# Patient Record
Sex: MALE | Race: BLACK OR AFRICAN AMERICAN | NOT HISPANIC OR LATINO | URBAN - METROPOLITAN AREA
[De-identification: names, ages, dates, MRNs, and addresses within clinical notes are randomized per-mention and may not be internally consistent; named-entity substitution may affect disease eponyms.]

---

## 2017-11-29 ENCOUNTER — EMERGENCY (EMERGENCY)
Facility: HOSPITAL | Age: 47
LOS: 1 days | Discharge: HOME | End: 2017-11-29
Attending: EMERGENCY MEDICINE | Admitting: EMERGENCY MEDICINE
Payer: MEDICARE

## 2017-11-29 VITALS
OXYGEN SATURATION: 99 % | RESPIRATION RATE: 18 BRPM | TEMPERATURE: 99 F | DIASTOLIC BLOOD PRESSURE: 99 MMHG | SYSTOLIC BLOOD PRESSURE: 176 MMHG | HEART RATE: 75 BPM | WEIGHT: 166.89 LBS | HEIGHT: 76 IN

## 2017-11-29 DIAGNOSIS — Z79.899 OTHER LONG TERM (CURRENT) DRUG THERAPY: ICD-10-CM

## 2017-11-29 DIAGNOSIS — Z79.52 LONG TERM (CURRENT) USE OF SYSTEMIC STEROIDS: ICD-10-CM

## 2017-11-29 DIAGNOSIS — R51 HEADACHE: ICD-10-CM

## 2017-11-29 DIAGNOSIS — G44.009 CLUSTER HEADACHE SYNDROME, UNSPECIFIED, NOT INTRACTABLE: ICD-10-CM

## 2017-11-29 DIAGNOSIS — I10 ESSENTIAL (PRIMARY) HYPERTENSION: ICD-10-CM

## 2017-11-29 LAB
ALBUMIN SERPL ELPH-MCNC: 4.1 G/DL — SIGNIFICANT CHANGE UP (ref 3.3–5)
ALP SERPL-CCNC: 63 U/L — SIGNIFICANT CHANGE UP (ref 40–120)
ALT FLD-CCNC: 13 U/L — SIGNIFICANT CHANGE UP (ref 10–45)
ANION GAP SERPL CALC-SCNC: 13 MMOL/L — SIGNIFICANT CHANGE UP (ref 5–17)
AST SERPL-CCNC: 23 U/L — SIGNIFICANT CHANGE UP (ref 10–40)
BASOPHILS NFR BLD AUTO: 0.7 % — SIGNIFICANT CHANGE UP (ref 0–2)
BILIRUB SERPL-MCNC: <0.2 MG/DL — SIGNIFICANT CHANGE UP (ref 0.2–1.2)
BUN SERPL-MCNC: 15 MG/DL — SIGNIFICANT CHANGE UP (ref 7–23)
CALCIUM SERPL-MCNC: 8.8 MG/DL — SIGNIFICANT CHANGE UP (ref 8.4–10.5)
CHLORIDE SERPL-SCNC: 103 MMOL/L — SIGNIFICANT CHANGE UP (ref 96–108)
CO2 SERPL-SCNC: 24 MMOL/L — SIGNIFICANT CHANGE UP (ref 22–31)
CREAT SERPL-MCNC: 0.98 MG/DL — SIGNIFICANT CHANGE UP (ref 0.5–1.3)
EOSINOPHIL NFR BLD AUTO: 0.2 % — SIGNIFICANT CHANGE UP (ref 0–6)
GLUCOSE SERPL-MCNC: 101 MG/DL — HIGH (ref 70–99)
HCT VFR BLD CALC: 37.1 % — LOW (ref 39–50)
HGB BLD-MCNC: 12.7 G/DL — LOW (ref 13–17)
LYMPHOCYTES # BLD AUTO: 32.9 % — SIGNIFICANT CHANGE UP (ref 13–44)
MAGNESIUM SERPL-MCNC: 1.9 MG/DL — SIGNIFICANT CHANGE UP (ref 1.6–2.6)
MCHC RBC-ENTMCNC: 28.8 PG — SIGNIFICANT CHANGE UP (ref 27–34)
MCHC RBC-ENTMCNC: 34.2 G/DL — SIGNIFICANT CHANGE UP (ref 32–36)
MCV RBC AUTO: 84.1 FL — SIGNIFICANT CHANGE UP (ref 80–100)
MONOCYTES NFR BLD AUTO: 7.4 % — SIGNIFICANT CHANGE UP (ref 2–14)
NEUTROPHILS NFR BLD AUTO: 58.8 % — SIGNIFICANT CHANGE UP (ref 43–77)
PLATELET # BLD AUTO: 294 K/UL — SIGNIFICANT CHANGE UP (ref 150–400)
POTASSIUM SERPL-MCNC: 5.3 MMOL/L — SIGNIFICANT CHANGE UP (ref 3.5–5.3)
POTASSIUM SERPL-SCNC: 5.3 MMOL/L — SIGNIFICANT CHANGE UP (ref 3.5–5.3)
PROT SERPL-MCNC: 6.9 G/DL — SIGNIFICANT CHANGE UP (ref 6–8.3)
RBC # BLD: 4.41 M/UL — SIGNIFICANT CHANGE UP (ref 4.2–5.8)
RBC # FLD: 14.1 % — SIGNIFICANT CHANGE UP (ref 10.3–16.9)
SODIUM SERPL-SCNC: 140 MMOL/L — SIGNIFICANT CHANGE UP (ref 135–145)
WBC # BLD: 4.4 K/UL — SIGNIFICANT CHANGE UP (ref 3.8–10.5)
WBC # FLD AUTO: 4.4 K/UL — SIGNIFICANT CHANGE UP (ref 3.8–10.5)

## 2017-11-29 PROCEDURE — 85025 COMPLETE CBC W/AUTO DIFF WBC: CPT

## 2017-11-29 PROCEDURE — 99284 EMERGENCY DEPT VISIT MOD MDM: CPT

## 2017-11-29 PROCEDURE — 99284 EMERGENCY DEPT VISIT MOD MDM: CPT | Mod: 25

## 2017-11-29 PROCEDURE — 36415 COLL VENOUS BLD VENIPUNCTURE: CPT

## 2017-11-29 PROCEDURE — 96372 THER/PROPH/DIAG INJ SC/IM: CPT | Mod: XU

## 2017-11-29 PROCEDURE — 83735 ASSAY OF MAGNESIUM: CPT

## 2017-11-29 PROCEDURE — 96375 TX/PRO/DX INJ NEW DRUG ADDON: CPT

## 2017-11-29 PROCEDURE — 80053 COMPREHEN METABOLIC PANEL: CPT

## 2017-11-29 PROCEDURE — 96374 THER/PROPH/DIAG INJ IV PUSH: CPT

## 2017-11-29 RX ORDER — KETOROLAC TROMETHAMINE 30 MG/ML
30 SYRINGE (ML) INJECTION ONCE
Qty: 0 | Refills: 0 | Status: DISCONTINUED | OUTPATIENT
Start: 2017-11-29 | End: 2017-11-29

## 2017-11-29 RX ORDER — DEXAMETHASONE 0.5 MG/5ML
10 ELIXIR ORAL ONCE
Qty: 0 | Refills: 0 | Status: COMPLETED | OUTPATIENT
Start: 2017-11-29 | End: 2017-11-29

## 2017-11-29 RX ORDER — SUMATRIPTAN SUCCINATE 4 MG/.5ML
1 INJECTION, SOLUTION SUBCUTANEOUS
Qty: 12 | Refills: 0
Start: 2017-11-29 | End: 2017-12-03

## 2017-11-29 RX ORDER — METOCLOPRAMIDE HCL 10 MG
10 TABLET ORAL ONCE
Qty: 0 | Refills: 0 | Status: COMPLETED | OUTPATIENT
Start: 2017-11-29 | End: 2017-11-29

## 2017-11-29 RX ORDER — DIPHENHYDRAMINE HCL 50 MG
25 CAPSULE ORAL ONCE
Qty: 0 | Refills: 0 | Status: COMPLETED | OUTPATIENT
Start: 2017-11-29 | End: 2017-11-29

## 2017-11-29 RX ORDER — SODIUM CHLORIDE 9 MG/ML
1000 INJECTION INTRAMUSCULAR; INTRAVENOUS; SUBCUTANEOUS ONCE
Qty: 0 | Refills: 0 | Status: COMPLETED | OUTPATIENT
Start: 2017-11-29 | End: 2017-11-29

## 2017-11-29 RX ORDER — SUMATRIPTAN SUCCINATE 4 MG/.5ML
6 INJECTION, SOLUTION SUBCUTANEOUS ONCE
Qty: 0 | Refills: 0 | Status: COMPLETED | OUTPATIENT
Start: 2017-11-29 | End: 2017-11-29

## 2017-11-29 RX ADMIN — Medication 25 MILLIGRAM(S): at 13:13

## 2017-11-29 RX ADMIN — Medication 104 MILLIGRAM(S): at 13:13

## 2017-11-29 RX ADMIN — Medication 30 MILLIGRAM(S): at 13:14

## 2017-11-29 RX ADMIN — SUMATRIPTAN SUCCINATE 6 MILLIGRAM(S): 4 INJECTION, SOLUTION SUBCUTANEOUS at 13:13

## 2017-11-29 RX ADMIN — Medication 10 MILLIGRAM(S): at 13:13

## 2017-11-29 RX ADMIN — SODIUM CHLORIDE 1000 MILLILITER(S): 9 INJECTION INTRAMUSCULAR; INTRAVENOUS; SUBCUTANEOUS at 13:13

## 2017-11-29 NOTE — ED ADULT NURSE NOTE - OBJECTIVE STATEMENT
Pt presents to ED A&Ox3 c/o worsening migraine and photophobia x 2 weeks. pt states he is on disability for migraines. pt has been taking OTC medication with no relief. denies dizziness, vision changes, n/v/d, fever.

## 2017-11-29 NOTE — ED ADULT TRIAGE NOTE - ARRIVAL INFO ADDITIONAL COMMENTS
Patient reports hx of migraines. Reports nausea/ vomiting and dizziness. Ambulating with steady gait. Reports hx of HTN, states "they haven't put me on any meds."

## 2017-11-29 NOTE — ED PROVIDER NOTE - OBJECTIVE STATEMENT
headache   46 yo with years of cluster headaches here today with a few days of daily similar headaches, right sided severe photophobia and lacrimation, typical of his usual headaches, went to an ER yesterday received reglan and a narcotic and had temporary relief, reports todays is severe, calls it the "big boy" however also reports he has suffered worse headaches in past. has seen neurology in past but not recently and has had nl head imaging in past.

## 2017-11-29 NOTE — ED PROVIDER NOTE - MEDICAL DECISION MAKING DETAILS
Second visit to ER for cluster headache in 2 days, reports have been occuring daily for 2 weeks, Cocktail of medications here including intranasal lidocaine applied with emptied Afrin bottle. successfully broke headache, patient given sumatriptan for home and steroid taper, neurology follow up advised.

## 2017-12-13 ENCOUNTER — APPOINTMENT (OUTPATIENT)
Dept: NEUROLOGY | Facility: CLINIC | Age: 47
End: 2017-12-13
Payer: MEDICARE

## 2017-12-13 VITALS
OXYGEN SATURATION: 98 % | HEART RATE: 78 BPM | SYSTOLIC BLOOD PRESSURE: 150 MMHG | BODY MASS INDEX: 20.76 KG/M2 | TEMPERATURE: 98.2 F | WEIGHT: 167 LBS | DIASTOLIC BLOOD PRESSURE: 105 MMHG | HEIGHT: 75 IN

## 2017-12-13 DIAGNOSIS — Z78.9 OTHER SPECIFIED HEALTH STATUS: ICD-10-CM

## 2017-12-13 DIAGNOSIS — Z00.00 ENCOUNTER FOR GENERAL ADULT MEDICAL EXAMINATION W/OUT ABNORMAL FINDINGS: ICD-10-CM

## 2017-12-13 PROCEDURE — 99205 OFFICE O/P NEW HI 60 MIN: CPT

## 2018-01-10 ENCOUNTER — APPOINTMENT (OUTPATIENT)
Dept: NEUROLOGY | Facility: CLINIC | Age: 48
End: 2018-01-10

## 2018-02-06 ENCOUNTER — TRANSCRIPTION ENCOUNTER (OUTPATIENT)
Age: 48
End: 2018-02-06

## 2018-09-26 ENCOUNTER — EMERGENCY (EMERGENCY)
Facility: HOSPITAL | Age: 48
LOS: 1 days | Discharge: HOME | End: 2018-09-26
Attending: EMERGENCY MEDICINE | Admitting: EMERGENCY MEDICINE
Payer: MEDICARE

## 2018-09-26 VITALS
TEMPERATURE: 99 F | OXYGEN SATURATION: 98 % | DIASTOLIC BLOOD PRESSURE: 94 MMHG | WEIGHT: 171.96 LBS | RESPIRATION RATE: 18 BRPM | SYSTOLIC BLOOD PRESSURE: 149 MMHG | HEART RATE: 89 BPM

## 2018-09-26 VITALS — HEART RATE: 58 BPM

## 2018-09-26 PROBLEM — I10 ESSENTIAL (PRIMARY) HYPERTENSION: Chronic | Status: ACTIVE | Noted: 2017-11-29

## 2018-09-26 PROBLEM — G44.011: Chronic | Status: ACTIVE | Noted: 2017-11-30

## 2018-09-26 LAB
ALBUMIN SERPL ELPH-MCNC: 4.2 G/DL — SIGNIFICANT CHANGE UP (ref 3.3–5)
ALP SERPL-CCNC: 77 U/L — SIGNIFICANT CHANGE UP (ref 40–120)
ALT FLD-CCNC: 21 U/L — SIGNIFICANT CHANGE UP (ref 10–45)
ANION GAP SERPL CALC-SCNC: 11 MMOL/L — SIGNIFICANT CHANGE UP (ref 5–17)
APPEARANCE UR: CLEAR — SIGNIFICANT CHANGE UP
APTT BLD: 33.1 SEC — SIGNIFICANT CHANGE UP (ref 27.5–37.4)
AST SERPL-CCNC: 18 U/L — SIGNIFICANT CHANGE UP (ref 10–40)
BASOPHILS NFR BLD AUTO: 0.8 % — SIGNIFICANT CHANGE UP (ref 0–2)
BILIRUB SERPL-MCNC: 0.2 MG/DL — SIGNIFICANT CHANGE UP (ref 0.2–1.2)
BILIRUB UR-MCNC: NEGATIVE — SIGNIFICANT CHANGE UP
BUN SERPL-MCNC: 17 MG/DL — SIGNIFICANT CHANGE UP (ref 7–23)
CALCIUM SERPL-MCNC: 9.4 MG/DL — SIGNIFICANT CHANGE UP (ref 8.4–10.5)
CHLORIDE SERPL-SCNC: 103 MMOL/L — SIGNIFICANT CHANGE UP (ref 96–108)
CO2 SERPL-SCNC: 28 MMOL/L — SIGNIFICANT CHANGE UP (ref 22–31)
COLOR SPEC: YELLOW — SIGNIFICANT CHANGE UP
CREAT SERPL-MCNC: 1 MG/DL — SIGNIFICANT CHANGE UP (ref 0.5–1.3)
DIFF PNL FLD: ABNORMAL
EOSINOPHIL NFR BLD AUTO: 3.4 % — SIGNIFICANT CHANGE UP (ref 0–6)
GLUCOSE SERPL-MCNC: 87 MG/DL — SIGNIFICANT CHANGE UP (ref 70–99)
GLUCOSE UR QL: NEGATIVE — SIGNIFICANT CHANGE UP
HCT VFR BLD CALC: 39.3 % — SIGNIFICANT CHANGE UP (ref 39–50)
HGB BLD-MCNC: 13 G/DL — SIGNIFICANT CHANGE UP (ref 13–17)
INR BLD: 1.16 — SIGNIFICANT CHANGE UP (ref 0.88–1.16)
KETONES UR-MCNC: NEGATIVE — SIGNIFICANT CHANGE UP
LEUKOCYTE ESTERASE UR-ACNC: NEGATIVE — SIGNIFICANT CHANGE UP
LIDOCAIN IGE QN: 22 U/L — SIGNIFICANT CHANGE UP (ref 7–60)
LYMPHOCYTES # BLD AUTO: 39.8 % — SIGNIFICANT CHANGE UP (ref 13–44)
MCHC RBC-ENTMCNC: 28.3 PG — SIGNIFICANT CHANGE UP (ref 27–34)
MCHC RBC-ENTMCNC: 33.1 G/DL — SIGNIFICANT CHANGE UP (ref 32–36)
MCV RBC AUTO: 85.4 FL — SIGNIFICANT CHANGE UP (ref 80–100)
MONOCYTES NFR BLD AUTO: 8.1 % — SIGNIFICANT CHANGE UP (ref 2–14)
NEUTROPHILS NFR BLD AUTO: 47.9 % — SIGNIFICANT CHANGE UP (ref 43–77)
NITRITE UR-MCNC: NEGATIVE — SIGNIFICANT CHANGE UP
PH UR: 7 — SIGNIFICANT CHANGE UP (ref 5–8)
PLATELET # BLD AUTO: 309 K/UL — SIGNIFICANT CHANGE UP (ref 150–400)
POTASSIUM SERPL-MCNC: 3.8 MMOL/L — SIGNIFICANT CHANGE UP (ref 3.5–5.3)
POTASSIUM SERPL-SCNC: 3.8 MMOL/L — SIGNIFICANT CHANGE UP (ref 3.5–5.3)
PROT SERPL-MCNC: 6.9 G/DL — SIGNIFICANT CHANGE UP (ref 6–8.3)
PROT UR-MCNC: NEGATIVE MG/DL — SIGNIFICANT CHANGE UP
PROTHROM AB SERPL-ACNC: 12.9 SEC — HIGH (ref 9.8–12.7)
RBC # BLD: 4.6 M/UL — SIGNIFICANT CHANGE UP (ref 4.2–5.8)
RBC # FLD: 13 % — SIGNIFICANT CHANGE UP (ref 10.3–16.9)
SODIUM SERPL-SCNC: 142 MMOL/L — SIGNIFICANT CHANGE UP (ref 135–145)
SP GR SPEC: 1.02 — SIGNIFICANT CHANGE UP (ref 1–1.03)
UROBILINOGEN FLD QL: 0.2 E.U./DL — SIGNIFICANT CHANGE UP
WBC # BLD: 5.3 K/UL — SIGNIFICANT CHANGE UP (ref 3.8–10.5)
WBC # FLD AUTO: 5.3 K/UL — SIGNIFICANT CHANGE UP (ref 3.8–10.5)

## 2018-09-26 PROCEDURE — 81001 URINALYSIS AUTO W/SCOPE: CPT

## 2018-09-26 PROCEDURE — 99284 EMERGENCY DEPT VISIT MOD MDM: CPT | Mod: 25

## 2018-09-26 PROCEDURE — 74177 CT ABD & PELVIS W/CONTRAST: CPT | Mod: 26

## 2018-09-26 PROCEDURE — 80053 COMPREHEN METABOLIC PANEL: CPT

## 2018-09-26 PROCEDURE — 74177 CT ABD & PELVIS W/CONTRAST: CPT

## 2018-09-26 PROCEDURE — 85730 THROMBOPLASTIN TIME PARTIAL: CPT

## 2018-09-26 PROCEDURE — 99284 EMERGENCY DEPT VISIT MOD MDM: CPT

## 2018-09-26 PROCEDURE — 36415 COLL VENOUS BLD VENIPUNCTURE: CPT

## 2018-09-26 PROCEDURE — 83690 ASSAY OF LIPASE: CPT

## 2018-09-26 PROCEDURE — 74019 RADEX ABDOMEN 2 VIEWS: CPT | Mod: 26

## 2018-09-26 PROCEDURE — 85610 PROTHROMBIN TIME: CPT

## 2018-09-26 PROCEDURE — 74019 RADEX ABDOMEN 2 VIEWS: CPT

## 2018-09-26 PROCEDURE — 71046 X-RAY EXAM CHEST 2 VIEWS: CPT | Mod: 26

## 2018-09-26 PROCEDURE — 85025 COMPLETE CBC W/AUTO DIFF WBC: CPT

## 2018-09-26 PROCEDURE — 71046 X-RAY EXAM CHEST 2 VIEWS: CPT

## 2018-09-26 RX ORDER — DOCUSATE SODIUM 100 MG
1 CAPSULE ORAL
Qty: 42 | Refills: 0 | OUTPATIENT
Start: 2018-09-26 | End: 2018-10-16

## 2018-09-26 RX ORDER — FONDAPARINUX SODIUM 2.5 MG/.5ML
1 INJECTION, SOLUTION SUBCUTANEOUS
Qty: 42 | Refills: 0 | OUTPATIENT
Start: 2018-09-26 | End: 2018-10-16

## 2018-09-26 RX ORDER — IOHEXOL 300 MG/ML
30 INJECTION, SOLUTION INTRAVENOUS ONCE
Qty: 0 | Refills: 0 | Status: COMPLETED | OUTPATIENT
Start: 2018-09-26 | End: 2018-09-26

## 2018-09-26 RX ORDER — RIVAROXABAN 15 MG-20MG
15 KIT ORAL ONCE
Qty: 0 | Refills: 0 | Status: COMPLETED | OUTPATIENT
Start: 2018-09-26 | End: 2018-09-26

## 2018-09-26 RX ADMIN — RIVAROXABAN 15 MILLIGRAM(S): KIT at 22:37

## 2018-09-26 RX ADMIN — IOHEXOL 30 MILLILITER(S): 300 INJECTION, SOLUTION INTRAVENOUS at 17:25

## 2018-09-26 NOTE — ED ADULT TRIAGE NOTE - CHIEF COMPLAINT QUOTE
" I have abdominal issues for months, I had a colonoscopy which showed a collapsed stomach and I am having trouble breathing and burping.

## 2018-09-26 NOTE — ED ADULT NURSE REASSESSMENT NOTE - NS ED NURSE REASSESS COMMENT FT1
PT denies D/N/V, alert and oriented x3, denies all medical complaints at this time.  Ambulatory with even gait.

## 2018-09-26 NOTE — ED PROVIDER NOTE - OBJECTIVE STATEMENT
49 yo male in the Er c/o chronic abdominal pain with constipation for the past year. Pt reports he had colonoscopy done and was seen by GI and PMD for his complaints few times. he was getting better with BM's , but for the past few month he almost always constipated.  pt c/o abdominal bloating, occasional nausea, c/o some pressure to his upper abdomen and feeling that he is " filled with gas". pt tried OTC medications for gas w/o improvement.  Had bleeding hemorrhoids last year, before his colonoscopy.

## 2018-09-26 NOTE — CONSULT NOTE ADULT - SUBJECTIVE AND OBJECTIVE BOX
Vascular Attending:  Cesra      HPI: 48 yoM with htn, headaches, and chronic constipation, now with abd pain and bloating for a few days , although reoccurs multiple times over last few years, colonoscopy last Dec wnl, chronic fecal urgency without production, chronically gassy and bloated.  Abd exam, no guarding, mild diffuse tenderness.  Ct scan today shows suspicious for thrombosis within the superior mesenteric vein. No CT evidence of bowel ischemia.      PAST MEDICAL & SURGICAL HISTORY:  Intractable episodic cluster headache  HTN (hypertension)      MEDICATIONS  (STANDING):    MEDICATIONS  (PRN):      Allergies    No Known Allergies    Intolerances        SOCIAL HISTORY:    FAMILY HISTORY:      Vital Signs Last 24 Hrs  T(C): 36.6 (26 Sep 2018 20:31), Max: 37.3 (26 Sep 2018 15:37)  T(F): 97.9 (26 Sep 2018 20:31), Max: 99.1 (26 Sep 2018 15:37)  HR: 53 (26 Sep 2018 20:31) (53 - 89)  BP: 134/85 (26 Sep 2018 20:31) (120/76 - 149/94)  BP(mean): --  RR: 18 (26 Sep 2018 20:31) (18 - 18)  SpO2: 99% (26 Sep 2018 20:31) (97% - 99%)    PHYSICAL EXAM:      Constitutional: NAD    Respiratory: cta b/l    Cardiovascular: s1s2 rrr    Gastrointestinal: soft diffuse tenderness nd    Extremities: WWP no swelling    Vascular: 2+ pal pulses throughout        LABS:                        13.0   5.3   )-----------( 309      ( 26 Sep 2018 17:35 )             39.3         142  |  103  |  17  ----------------------------<  87  3.8   |  28  |  1.00    Ca    9.4      26 Sep 2018 17:35    TPro  6.9  /  Alb  4.2  /  TBili  0.2  /  DBili  x   /  AST  18  /  ALT  21  /  AlkPhos  77      PT/INR - ( 26 Sep 2018 21:22 )   PT: 12.9 sec;   INR: 1.16          PTT - ( 26 Sep 2018 21:22 )  PTT:33.1 sec  Urinalysis Basic - ( 26 Sep 2018 17:51 )    Color: Yellow / Appearance: Clear / S.020 / pH: x  Gluc: x / Ketone: NEGATIVE  / Bili: Negative / Urobili: 0.2 E.U./dL   Blood: x / Protein: NEGATIVE mg/dL / Nitrite: NEGATIVE   Leuk Esterase: NEGATIVE / RBC: < 5 /HPF / WBC < 5 /HPF   Sq Epi: x / Non Sq Epi: 0-5 /HPF / Bacteria: Present /HPF        RADIOLOGY & ADDITIONAL STUDIES

## 2018-09-26 NOTE — ED PROVIDER NOTE - MEDICAL DECISION MAKING DETAILS
49 yo male with h/o chronic constipations, in the Er due to abd. distention, bloating, intermittent nausea, diarrhea. appears non-toxic, has BS+ in all 4 quadrants, non-surgical abdomen on exam. labs and CT scan done. findings consistent with SMV thrombosis. no bowel ischemia.  Pt evaluated by vascular surgery on call. Dc home with PO Xarelto recommended, out pt GI and vascular f/u recommended. pt agrees with a treatment plan, verbalized understanding. returned precautions discussed.

## 2018-09-26 NOTE — ED PROVIDER NOTE - ATTENDING CONTRIBUTION TO CARE
47 yo with chronic constipation, now with abd pain and bloating for a few days , although reoccurs multiple times over last few years, colonoscopy last Dec wnl, chronic fecal urgency without production, chronically gassy and bloated.  Abd exam, no guarding, mild diffuse tenderness. Plan to CT to eval for SBO, mass, , if ok will d/c with GI f/u and bowel regimine,

## 2018-09-26 NOTE — ED ADULT NURSE NOTE - OBJECTIVE STATEMENT
Pt CO digestive issues x1 year including decreased BMs and inability to belch.  Pt states "I had a CT and Colonoscopy last December and they said my stomach was collapsed, and ever since then I'm having trouble moving my bowels and I cannot release gas and let myself burp."  EKG obtained in Triage.  Pt denies N/v/D, CP, SOB, Fevers and Dizziness.  PMHx includes HTN and Migraines.

## 2018-09-26 NOTE — CONSULT NOTE ADULT - ASSESSMENT
48 yoM with htn, headaches, and chronic constipation, now with abd pain and bloating for a few days , although reoccurs multiple times over last few years, colonoscopy last Dec wnl, chronic fecal urgency without production, chronically gassy and bloated.  Abd exam, no guarding, mild diffuse tenderness.  Ct scan today shows suspicious for thrombosis within the superior mesenteric vein. No CT evidence of bowel ischemia.    -recommend anticoagulation  -f/u with Dr. Guerrero as outpatient

## 2018-09-27 ENCOUNTER — APPOINTMENT (OUTPATIENT)
Dept: VASCULAR SURGERY | Facility: CLINIC | Age: 48
End: 2018-09-27
Payer: MEDICARE

## 2018-09-27 DIAGNOSIS — Z86.2 PERSONAL HISTORY OF DISEASES OF THE BLOOD AND BLOOD-FORMING ORGANS AND CERTAIN DISORDERS INVOLVING THE IMMUNE MECHANISM: ICD-10-CM

## 2018-09-27 DIAGNOSIS — Z86.79 PERSONAL HISTORY OF OTHER DISEASES OF THE CIRCULATORY SYSTEM: ICD-10-CM

## 2018-09-27 DIAGNOSIS — Z87.828 PERSONAL HISTORY OF OTHER (HEALED) PHYSICAL INJURY AND TRAUMA: ICD-10-CM

## 2018-09-27 PROCEDURE — 99204 OFFICE O/P NEW MOD 45 MIN: CPT

## 2018-09-27 RX ORDER — DOCUSATE SODIUM 100 MG
1 CAPSULE ORAL
Qty: 42 | Refills: 0
Start: 2018-09-27 | End: 2018-10-17

## 2018-09-27 RX ORDER — FONDAPARINUX SODIUM 2.5 MG/.5ML
1 INJECTION, SOLUTION SUBCUTANEOUS
Qty: 42 | Refills: 0
Start: 2018-09-27 | End: 2018-10-17

## 2018-09-28 PROBLEM — Z86.2 HISTORY OF COAGULATION DEFECT: Status: RESOLVED | Noted: 2018-09-27 | Resolved: 2018-09-28

## 2018-09-28 PROBLEM — Z87.828 HISTORY OF GUNSHOT WOUND: Status: RESOLVED | Noted: 2018-09-27 | Resolved: 2018-09-28

## 2018-09-28 PROBLEM — Z86.79 HISTORY OF HYPERTENSION: Status: RESOLVED | Noted: 2018-09-27 | Resolved: 2018-09-28

## 2018-09-30 DIAGNOSIS — R10.10 UPPER ABDOMINAL PAIN, UNSPECIFIED: ICD-10-CM

## 2018-09-30 DIAGNOSIS — K59.00 CONSTIPATION, UNSPECIFIED: ICD-10-CM

## 2018-09-30 DIAGNOSIS — I10 ESSENTIAL (PRIMARY) HYPERTENSION: ICD-10-CM

## 2018-09-30 DIAGNOSIS — Z79.52 LONG TERM (CURRENT) USE OF SYSTEMIC STEROIDS: ICD-10-CM

## 2018-09-30 DIAGNOSIS — Z79.899 OTHER LONG TERM (CURRENT) DRUG THERAPY: ICD-10-CM

## 2018-09-30 DIAGNOSIS — I81 PORTAL VEIN THROMBOSIS: ICD-10-CM

## 2018-10-03 ENCOUNTER — APPOINTMENT (OUTPATIENT)
Dept: GASTROENTEROLOGY | Facility: CLINIC | Age: 48
End: 2018-10-03
Payer: MEDICARE

## 2018-10-03 VITALS
OXYGEN SATURATION: 98 % | HEIGHT: 75 IN | SYSTOLIC BLOOD PRESSURE: 130 MMHG | BODY MASS INDEX: 20.76 KG/M2 | TEMPERATURE: 98 F | DIASTOLIC BLOOD PRESSURE: 90 MMHG | RESPIRATION RATE: 16 BRPM | WEIGHT: 167 LBS | HEART RATE: 97 BPM

## 2018-10-03 PROCEDURE — 99203 OFFICE O/P NEW LOW 30 MIN: CPT

## 2018-10-30 ENCOUNTER — APPOINTMENT (OUTPATIENT)
Dept: GASTROENTEROLOGY | Facility: HOSPITAL | Age: 48
End: 2018-10-30
Payer: MEDICARE

## 2018-10-30 ENCOUNTER — OUTPATIENT (OUTPATIENT)
Dept: OUTPATIENT SERVICES | Facility: HOSPITAL | Age: 48
LOS: 1 days | Discharge: HOME | End: 2018-10-30
Payer: MEDICARE

## 2018-10-30 PROCEDURE — 91122: CPT

## 2018-10-30 PROCEDURE — 91120: CPT | Mod: 26

## 2018-10-30 PROCEDURE — 91122 ANORECTAL MANOMETRY: CPT | Mod: 26

## 2018-10-31 ENCOUNTER — RESULT REVIEW (OUTPATIENT)
Age: 48
End: 2018-10-31

## 2018-11-14 ENCOUNTER — RX RENEWAL (OUTPATIENT)
Age: 48
End: 2018-11-14

## 2018-11-14 ENCOUNTER — APPOINTMENT (OUTPATIENT)
Dept: GASTROENTEROLOGY | Facility: CLINIC | Age: 48
End: 2018-11-14
Payer: MEDICARE

## 2018-11-14 VITALS
DIASTOLIC BLOOD PRESSURE: 90 MMHG | HEART RATE: 95 BPM | OXYGEN SATURATION: 96 % | BODY MASS INDEX: 21.25 KG/M2 | RESPIRATION RATE: 16 BRPM | SYSTOLIC BLOOD PRESSURE: 118 MMHG | WEIGHT: 170 LBS

## 2018-11-14 PROCEDURE — 99214 OFFICE O/P EST MOD 30 MIN: CPT

## 2018-12-29 NOTE — ED PROVIDER NOTE - GASTROINTESTINAL, MLM
Abdomen soft, non-distended, diffuse generalized tenderness to upper abdomen, no g/r, no CVAT b/l, BS+ in all 4 quadrants

## 2019-01-10 ENCOUNTER — APPOINTMENT (OUTPATIENT)
Dept: GASTROENTEROLOGY | Facility: CLINIC | Age: 49
End: 2019-01-10
Payer: MEDICARE

## 2019-01-10 VITALS
WEIGHT: 175 LBS | OXYGEN SATURATION: 98 % | SYSTOLIC BLOOD PRESSURE: 140 MMHG | DIASTOLIC BLOOD PRESSURE: 90 MMHG | HEIGHT: 75 IN | RESPIRATION RATE: 14 BRPM | BODY MASS INDEX: 21.76 KG/M2 | HEART RATE: 56 BPM

## 2019-01-10 PROCEDURE — 99214 OFFICE O/P EST MOD 30 MIN: CPT

## 2019-02-11 ENCOUNTER — APPOINTMENT (OUTPATIENT)
Dept: GASTROENTEROLOGY | Facility: HOSPITAL | Age: 49
End: 2019-02-11
Payer: MEDICARE

## 2019-02-11 ENCOUNTER — OUTPATIENT (OUTPATIENT)
Dept: OUTPATIENT SERVICES | Facility: HOSPITAL | Age: 49
LOS: 1 days | Discharge: HOME | End: 2019-02-11
Payer: MEDICARE

## 2019-02-11 PROCEDURE — XXXXX: CPT

## 2019-02-11 PROCEDURE — 91010 ESOPHAGUS MOTILITY STUDY: CPT

## 2019-03-02 ENCOUNTER — TRANSCRIPTION ENCOUNTER (OUTPATIENT)
Age: 49
End: 2019-03-02

## 2019-03-06 ENCOUNTER — APPOINTMENT (OUTPATIENT)
Dept: GASTROENTEROLOGY | Facility: CLINIC | Age: 49
End: 2019-03-06

## 2019-03-20 ENCOUNTER — APPOINTMENT (OUTPATIENT)
Dept: GASTROENTEROLOGY | Facility: CLINIC | Age: 49
End: 2019-03-20
Payer: MEDICARE

## 2019-03-20 VITALS
DIASTOLIC BLOOD PRESSURE: 98 MMHG | RESPIRATION RATE: 16 BRPM | HEIGHT: 75 IN | OXYGEN SATURATION: 97 % | TEMPERATURE: 98.3 F | WEIGHT: 168 LBS | BODY MASS INDEX: 20.89 KG/M2 | HEART RATE: 90 BPM | SYSTOLIC BLOOD PRESSURE: 140 MMHG

## 2019-03-20 PROCEDURE — 99215 OFFICE O/P EST HI 40 MIN: CPT | Mod: 25

## 2019-03-20 PROCEDURE — 36415 COLL VENOUS BLD VENIPUNCTURE: CPT

## 2019-03-21 LAB
ALBUMIN SERPL ELPH-MCNC: 4.2 G/DL
ALP BLD-CCNC: 89 U/L
ALT SERPL-CCNC: 13 U/L
ANION GAP SERPL CALC-SCNC: 11 MMOL/L
AST SERPL-CCNC: 16 U/L
BASOPHILS # BLD AUTO: 0.05 K/UL
BASOPHILS NFR BLD AUTO: 1 %
BILIRUB SERPL-MCNC: 0.2 MG/DL
BUN SERPL-MCNC: 17 MG/DL
CALCIUM SERPL-MCNC: 9 MG/DL
CHLORIDE SERPL-SCNC: 105 MMOL/L
CO2 SERPL-SCNC: 26 MMOL/L
CREAT SERPL-MCNC: 0.99 MG/DL
EOSINOPHIL # BLD AUTO: 0.2 K/UL
EOSINOPHIL NFR BLD AUTO: 3.9 %
GLUCOSE SERPL-MCNC: 82 MG/DL
HBA1C MFR BLD HPLC: 5 %
HCT VFR BLD CALC: 42.5 %
HGB BLD-MCNC: 13.7 G/DL
IMM GRANULOCYTES NFR BLD AUTO: 0.2 %
INR PPP: 1.08 RATIO
LYMPHOCYTES # BLD AUTO: 2.1 K/UL
LYMPHOCYTES NFR BLD AUTO: 40.5 %
MAN DIFF?: NORMAL
MCHC RBC-ENTMCNC: 28.8 PG
MCHC RBC-ENTMCNC: 32.2 GM/DL
MCV RBC AUTO: 89.5 FL
MONOCYTES # BLD AUTO: 0.46 K/UL
MONOCYTES NFR BLD AUTO: 8.9 %
NEUTROPHILS # BLD AUTO: 2.36 K/UL
NEUTROPHILS NFR BLD AUTO: 45.5 %
PLATELET # BLD AUTO: 333 K/UL
POTASSIUM SERPL-SCNC: 4.1 MMOL/L
PROT SERPL-MCNC: 6.6 G/DL
PT BLD: 12.5 SEC
RBC # BLD: 4.75 M/UL
RBC # FLD: 13.5 %
SODIUM SERPL-SCNC: 142 MMOL/L
TSH SERPL-ACNC: 0.53 UIU/ML
WBC # FLD AUTO: 5.18 K/UL

## 2019-03-21 RX ORDER — LINACLOTIDE 72 UG/1
72 CAPSULE, GELATIN COATED ORAL DAILY
Qty: 30 | Refills: 1 | Status: DISCONTINUED | COMMUNITY
Start: 2018-11-14 | End: 2019-03-21

## 2019-03-21 NOTE — PHYSICAL EXAM
[General Appearance - Alert] : alert [General Appearance - Well Nourished] : well nourished [Sclera] : the sclera and conjunctiva were normal [Outer Ear] : the ears and nose were normal in appearance [Oropharynx] : the oropharynx was normal [Neck Appearance] : the appearance of the neck was normal [Neck Cervical Mass (___cm)] : no neck mass was observed [Respiration, Rhythm And Depth] : normal respiratory rhythm and effort [Apical Impulse] : the apical impulse was normal [Heart Rate And Rhythm] : heart rate was normal and rhythm regular [Edema] : there was no peripheral edema [Bowel Sounds] : normal bowel sounds [Abdomen Soft] : soft [Abdomen Tenderness] : non-tender [Abdomen Mass (___ Cm)] : no abdominal mass palpated [Abnormal Walk] : normal gait [Skin Color & Pigmentation] : normal skin color and pigmentation [] : no rash [Oriented To Time, Place, And Person] : oriented to person, place, and time

## 2019-03-21 NOTE — HISTORY OF PRESENT ILLNESS
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 He is also still having rectal bleeding where he notices a small amount of blood on the toilet paper. He also has abdominal pain with burning from the stomach up until the esophagus. He is also still struggling with constipation. Has been taking Linzess 72mg daily which has helped him have more frequent bowel movements (3x/week) compared to prior where he was barely having BMs weekly. \par Reports recently had HRM done (waiting for report to be read) at [facility]. \par \par Previous hx:\par Endoscopy/Colonoscopy done in December 2017 at [facility] (requested results)\par Lactulose hydrogen breath test: negative for small intestinal bacterial overgrowth \par ARM: Pelvic floor dyssynergia, Type I \par
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
## 2019-03-29 ENCOUNTER — EMERGENCY (EMERGENCY)
Facility: HOSPITAL | Age: 49
LOS: 1 days | Discharge: HOME | End: 2019-03-29
Attending: EMERGENCY MEDICINE | Admitting: EMERGENCY MEDICINE
Payer: MEDICARE

## 2019-03-29 VITALS
OXYGEN SATURATION: 98 % | HEART RATE: 64 BPM | RESPIRATION RATE: 16 BRPM | SYSTOLIC BLOOD PRESSURE: 134 MMHG | TEMPERATURE: 98 F | DIASTOLIC BLOOD PRESSURE: 81 MMHG

## 2019-03-29 VITALS
HEART RATE: 73 BPM | WEIGHT: 167.99 LBS | OXYGEN SATURATION: 98 % | TEMPERATURE: 98 F | RESPIRATION RATE: 16 BRPM | SYSTOLIC BLOOD PRESSURE: 137 MMHG | DIASTOLIC BLOOD PRESSURE: 93 MMHG

## 2019-03-29 PROCEDURE — 99283 EMERGENCY DEPT VISIT LOW MDM: CPT
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
## 2019-03-29 NOTE — ED ADULT NURSE NOTE - CHPI ED NUR SYMPTOMS NEG
no deformity/no stiffness/no abrasion/no bruising/no difficulty bearing weight/no back pain/no weakness/no numbness/no tingling/no fever

## 2019-03-29 NOTE — ED PROVIDER NOTE - NSFOLLOWUPINSTRUCTIONS_ED_ALL_ED_FT
[place] [facility]aditional Chinese[place]    Wrist Pain, Adult  ImageThere are many things that can cause wrist pain. Some common causes include:    An injury to the wrist area, such as a sprain, strain, or fracture.  Overuse of the joint.  A condition that causes increased pressure on a nerve in the wrist (carpal tunnel syndrome).  Wear and tear of the joints that occurs with aging (osteoarthritis).  A variety of other types of arthritis.    Sometimes, the cause of wrist pain is not known. Often, the pain goes away when you follow instructions from your health care provider for relieving pain at home, such as resting or icing the wrist. If your wrist pain continues, it is important to tell your health care provider.    Follow these instructions at home:  Rest the wrist area for at least 48 hours or as long as told by your health care provider.  If a splint or elastic bandage has been applied, use it as told by your health care provider.    Remove the splint or bandage only as told by your health care provider.  Loosen the splint or bandage if your fingers tingle, become numb, or turn cold or blue.    ImageIf directed, apply ice to the injured area.    If you have a removable splint or elastic bandage, remove it as told by your health care provider.  Put ice in a plastic bag.  Place a towel between your skin and the bag or between your splint or bandage and the bag.  Leave the ice on for 20 minutes, 2–3 times a day.    Keep your arm raised (elevated) above the level of your heart while you are sitting or lying down.  Take over-the-counter and prescription medicines only as told by your health care provider.  Keep all follow-up visits as told by your health care provider. This is important.  Contact a health care provider if:  You have a sudden sharp pain in the wrist, hand, or arm that is different or new.  The swelling or bruising on your wrist or hand gets worse.  Your skin becomes red, gets a rash, or has open sores.  Your pain does not get better or it gets worse.  Get help right away if:  You lose feeling in your fingers or hand.  Your fingers turn white, very red, or cold and blue.  You cannot move your fingers.  You have a fever or chills.  This information is not intended to replace advice given to you by your health care provider. Make sure you discuss any questions you have with your health care provider.    Document Released: 09/27/2006 Document Revised: 07/13/2017 Document Reviewed: 07/06/2017  [employer] Interactive Patient Education © 2019 [employer]

## 2019-03-29 NOTE — ED ADULT NURSE NOTE - CHIEF COMPLAINT QUOTE
"I had carpal tunnel surgery in [facility] 4 months ago but the pain is bad today"
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
## 2019-03-29 NOTE — ED PROVIDER NOTE - PHYSICAL EXAMINATION
General: Patient is well developed and well nourised. Patient is alert and oriented to person, place and date. Patient is laying comfortably in stretcher and appears in no acute distress.  HEENT: Head is normocephalic and atraumatic. Pupils are equal, round and reactive. Extraocular movements intact. No evidence of nystagmus, conjunctival injection, or scleral icterus. External ears symmetric without evidence of discharge.  Nose is symmetric, non-tender, patent without evidence of discharge. Teeth in good repair. Uvula midline.   Neck: Supple with no evidence of lymphadenopathy.  Full range of motion.  Lungs: Equal chest expansion. No note of retractions.  Musckloskeletal: No edema, erythema, ecchymosis, atrophy or deformity. Full range of motion in all four extremities.  No clubbing or cyanosis. No point tenderness to palpation.   Neuro: GCS 15. Moving all extremities without discomfort. Strength is 5/5 arms and legs bilaterally. Sensation intact in all four extremities. gait steady   Skin: left cubital tunnel and carpal tunnel scarring, no erythema or fluctance. Warm, dry and intact without evidence of rashes, bruising, pallor, jaundice or cyanosis.   Psych: Mood and affect appropriate. General: Patient is well developed and well nourised. Patient is alert and oriented to person, place and date. Patient is laying comfortably in stretcher and appears in no acute distress.  HEENT: Head is normocephalic and atraumatic. Extraocular movements intact. No evidence of nystagmus, conjunctival injection, or scleral icterus. External ears symmetric without evidence of discharge.  Nose is symmetric, without evidence of discharge. Teeth in good repair.   Neck:  Full range of motion.  Lungs: Equal chest expansion. No note of retractions.  Musckloskeletal: full rom of the left elbow and wrist. limited ROM of the 4/5 digit left hand. No edema, erythema, ecchymosis, atrophy or deformity. Full range of motion in all other extremities.  No clubbing or cyanosis. No point tenderness to palpation.   Neuro: GCS 15. Moving all extremities without discomfort. Strength is 5/5 arms and legs bilaterally. Sensation intact in all four extremities. gait steady   Skin: left cubital tunnel and carpal tunnel scarring, no erythema or fluctuance. Warm, dry and intact without evidence of rashes, bruising, pallor, jaundice or cyanosis.   Psych: Mood and affect appropriate.

## 2019-04-02 DIAGNOSIS — M25.532 PAIN IN LEFT WRIST: ICD-10-CM

## 2019-04-02 DIAGNOSIS — G89.29 OTHER CHRONIC PAIN: ICD-10-CM

## 2019-04-02 DIAGNOSIS — Z79.899 OTHER LONG TERM (CURRENT) DRUG THERAPY: ICD-10-CM

## 2019-04-02 DIAGNOSIS — Z79.52 LONG TERM (CURRENT) USE OF SYSTEMIC STEROIDS: ICD-10-CM

## 2019-04-04 ENCOUNTER — OUTPATIENT (OUTPATIENT)
Dept: OUTPATIENT SERVICES | Facility: HOSPITAL | Age: 49
LOS: 1 days | End: 2019-04-04
Payer: MEDICARE

## 2019-04-04 ENCOUNTER — APPOINTMENT (OUTPATIENT)
Dept: GASTROENTEROLOGY | Facility: CLINIC | Age: 49
End: 2019-04-04

## 2019-04-04 ENCOUNTER — APPOINTMENT (OUTPATIENT)
Dept: ORTHOPEDIC SURGERY | Facility: CLINIC | Age: 49
End: 2019-04-04
Payer: MEDICARE

## 2019-04-04 VITALS — BODY MASS INDEX: 21.14 KG/M2 | HEIGHT: 75 IN | WEIGHT: 170 LBS

## 2019-04-04 DIAGNOSIS — Z87.898 PERSONAL HISTORY OF OTHER SPECIFIED CONDITIONS: ICD-10-CM

## 2019-04-04 DIAGNOSIS — R51 HEADACHE: ICD-10-CM

## 2019-04-04 DIAGNOSIS — R14.0 ABDOMINAL DISTENSION (GASEOUS): ICD-10-CM

## 2019-04-04 DIAGNOSIS — Z86.69 PERSONAL HISTORY OF OTHER DISEASES OF THE NERVOUS SYSTEM AND SENSE ORGANS: ICD-10-CM

## 2019-04-04 DIAGNOSIS — G56.22 LESION OF ULNAR NERVE, LEFT UPPER LIMB: ICD-10-CM

## 2019-04-04 PROCEDURE — 73130 X-RAY EXAM OF HAND: CPT | Mod: 26,LT

## 2019-04-04 PROCEDURE — 73110 X-RAY EXAM OF WRIST: CPT | Mod: 26,LT

## 2019-04-04 PROCEDURE — 73070 X-RAY EXAM OF ELBOW: CPT

## 2019-04-04 PROCEDURE — 99202 OFFICE O/P NEW SF 15 MIN: CPT

## 2019-04-04 PROCEDURE — 73070 X-RAY EXAM OF ELBOW: CPT | Mod: 26,LT

## 2019-04-04 PROCEDURE — 73130 X-RAY EXAM OF HAND: CPT

## 2019-04-04 PROCEDURE — 73110 X-RAY EXAM OF WRIST: CPT

## 2019-04-04 NOTE — PHYSICAL EXAM
[de-identified] : Gen: NAD\par left upper extremity\par Healed incisions over elbow and wrist\par Clawing and ulnar drift on 4th and 5th\par No bony tenderness\par Positive Tinels at elbow and wrist\par 5/5 Wf/WE/\par 0/5 intrinsics\par Decreased sensation over ulnar nerve, intact to light touch over Ax/M/R\par 2+ brachial/radial [de-identified] : xrays of elbow, forearm, wrist and hand demonstrate no fractures of soft tissue swelling\par No degenerative changes of elbow, wrist and carpus
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
## 2019-04-09 ENCOUNTER — APPOINTMENT (OUTPATIENT)
Dept: INTERNAL MEDICINE | Facility: CLINIC | Age: 49
End: 2019-04-09
Payer: MEDICARE

## 2019-04-09 VITALS — SYSTOLIC BLOOD PRESSURE: 128 MMHG | DIASTOLIC BLOOD PRESSURE: 76 MMHG

## 2019-04-09 VITALS
HEART RATE: 59 BPM | HEIGHT: 75 IN | OXYGEN SATURATION: 99 % | BODY MASS INDEX: 21.64 KG/M2 | TEMPERATURE: 97.9 F | DIASTOLIC BLOOD PRESSURE: 81 MMHG | SYSTOLIC BLOOD PRESSURE: 171 MMHG | WEIGHT: 174 LBS

## 2019-04-09 VITALS — DIASTOLIC BLOOD PRESSURE: 80 MMHG | SYSTOLIC BLOOD PRESSURE: 132 MMHG

## 2019-04-09 PROCEDURE — G0438: CPT | Mod: 25

## 2019-04-09 PROCEDURE — 99214 OFFICE O/P EST MOD 30 MIN: CPT | Mod: GC,25

## 2019-04-22 ENCOUNTER — FORM ENCOUNTER (OUTPATIENT)
Age: 49
End: 2019-04-22

## 2019-04-23 ENCOUNTER — OUTPATIENT (OUTPATIENT)
Dept: OUTPATIENT SERVICES | Facility: HOSPITAL | Age: 49
LOS: 1 days | End: 2019-04-23
Payer: MEDICARE

## 2019-04-23 ENCOUNTER — APPOINTMENT (OUTPATIENT)
Dept: GASTROENTEROLOGY | Facility: CLINIC | Age: 49
End: 2019-04-23
Payer: MEDICARE

## 2019-04-23 VITALS
WEIGHT: 170 LBS | RESPIRATION RATE: 14 BRPM | DIASTOLIC BLOOD PRESSURE: 98 MMHG | OXYGEN SATURATION: 97 % | BODY MASS INDEX: 21.25 KG/M2 | SYSTOLIC BLOOD PRESSURE: 120 MMHG | HEART RATE: 79 BPM

## 2019-04-23 DIAGNOSIS — R14.2 ERUCTATION: ICD-10-CM

## 2019-04-23 PROCEDURE — 99215 OFFICE O/P EST HI 40 MIN: CPT

## 2019-04-23 PROCEDURE — 71046 X-RAY EXAM CHEST 2 VIEWS: CPT | Mod: 26

## 2019-04-23 PROCEDURE — 71046 X-RAY EXAM CHEST 2 VIEWS: CPT

## 2019-04-24 NOTE — PHYSICAL EXAM
[General Appearance - Alert] : alert [General Appearance - Well Nourished] : well nourished [Sclera] : the sclera and conjunctiva were normal [Outer Ear] : the ears and nose were normal in appearance [Oropharynx] : the oropharynx was normal [Neck Appearance] : the appearance of the neck was normal [Neck Cervical Mass (___cm)] : no neck mass was observed [Apical Impulse] : the apical impulse was normal [Respiration, Rhythm And Depth] : normal respiratory rhythm and effort [Heart Rate And Rhythm] : heart rate was normal and rhythm regular [Bowel Sounds] : normal bowel sounds [Edema] : there was no peripheral edema [Abdomen Soft] : soft [Abnormal Walk] : normal gait [Abdomen Mass (___ Cm)] : no abdominal mass palpated [Abdomen Tenderness] : non-tender [] : no rash [Skin Color & Pigmentation] : normal skin color and pigmentation [Oriented To Time, Place, And Person] : oriented to person, place, and time [No CVA Tenderness] : no ~M costovertebral angle tenderness [No Focal Deficits] : no focal deficits

## 2019-04-24 NOTE — HISTORY OF PRESENT ILLNESS
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 a lot of burping. burrning\par - not drihnking soda or other carbonated beverages, just water. cut down dairy and acid containing fluids\par - still didn’t get outside meds\par - currently taking NSAIDs 2x/day and also started meloxicam. 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 He is also still having rectal bleeding where he notices a small amount of blood on the toilet paper. He also has abdominal pain with burning from the stomach up until the esophagus. He is also still struggling with constipation. Has been taking Linzess 72mg daily which has helped him have more frequent bowel movements (3x/week) compared to prior where he was barely having BMs weekly. \par Reports recently had HRM done (waiting for report to be read) at [facility]. \par \par Previous hx:\par Endoscopy/Colonoscopy done in December 2017 at [facility] (requested results)\par Lactulose hydrogen breath test: negative for small intestinal bacterial overgrowth \par ARM: Pelvic floor dyssynergia, Type I \par

## 2019-04-26 ENCOUNTER — MESSAGE (OUTPATIENT)
Age: 49
End: 2019-04-26

## 2019-05-01 ENCOUNTER — APPOINTMENT (OUTPATIENT)
Dept: NEUROLOGY | Facility: CLINIC | Age: 49
End: 2019-05-01
Payer: MEDICARE

## 2019-05-01 VITALS
WEIGHT: 173 LBS | BODY MASS INDEX: 21.51 KG/M2 | SYSTOLIC BLOOD PRESSURE: 156 MMHG | OXYGEN SATURATION: 97 % | DIASTOLIC BLOOD PRESSURE: 99 MMHG | HEIGHT: 75 IN | HEART RATE: 74 BPM

## 2019-05-01 PROCEDURE — 99214 OFFICE O/P EST MOD 30 MIN: CPT

## 2019-05-15 ENCOUNTER — FORM ENCOUNTER (OUTPATIENT)
Age: 49
End: 2019-05-15

## 2019-05-16 ENCOUNTER — OUTPATIENT (OUTPATIENT)
Dept: OUTPATIENT SERVICES | Facility: HOSPITAL | Age: 49
LOS: 1 days | End: 2019-05-16
Payer: MEDICARE

## 2019-05-16 ENCOUNTER — APPOINTMENT (OUTPATIENT)
Dept: MRI IMAGING | Facility: HOSPITAL | Age: 49
End: 2019-05-16
Payer: MEDICARE

## 2019-05-16 PROCEDURE — 70551 MRI BRAIN STEM W/O DYE: CPT | Mod: 26

## 2019-05-16 PROCEDURE — 70551 MRI BRAIN STEM W/O DYE: CPT

## 2019-05-18 ENCOUNTER — EMERGENCY (EMERGENCY)
Facility: HOSPITAL | Age: 49
LOS: 1 days | Discharge: HOME | End: 2019-05-18
Admitting: EMERGENCY MEDICINE
Payer: MEDICARE

## 2019-05-18 VITALS
RESPIRATION RATE: 18 BRPM | HEART RATE: 87 BPM | OXYGEN SATURATION: 99 % | DIASTOLIC BLOOD PRESSURE: 93 MMHG | TEMPERATURE: 98 F | WEIGHT: 168.43 LBS | SYSTOLIC BLOOD PRESSURE: 140 MMHG | HEIGHT: 75 IN

## 2019-05-18 DIAGNOSIS — M79.644 PAIN IN RIGHT FINGER(S): ICD-10-CM
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
## 2019-05-18 DIAGNOSIS — S62.521A DISPLACED FRACTURE OF DISTAL PHALANX OF RIGHT THUMB, INITIAL ENCOUNTER FOR CLOSED FRACTURE: ICD-10-CM
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
## 2019-05-18 DIAGNOSIS — Z79.52 LONG TERM (CURRENT) USE OF SYSTEMIC STEROIDS: ICD-10-CM

## 2019-05-18 DIAGNOSIS — Z79.899 OTHER LONG TERM (CURRENT) DRUG THERAPY: ICD-10-CM

## 2019-05-18 DIAGNOSIS — Z79.891 LONG TERM (CURRENT) USE OF OPIATE ANALGESIC: ICD-10-CM
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
## 2019-05-18 PROCEDURE — 99284 EMERGENCY DEPT VISIT MOD MDM: CPT

## 2019-05-18 PROCEDURE — 99284 EMERGENCY DEPT VISIT MOD MDM: CPT | Mod: 25

## 2019-05-18 PROCEDURE — 73130 X-RAY EXAM OF HAND: CPT

## 2019-05-18 PROCEDURE — 73130 X-RAY EXAM OF HAND: CPT | Mod: 26,RT

## 2019-05-18 PROCEDURE — 26750 TREAT FINGER FRACTURE EACH: CPT

## 2019-05-18 RX ORDER — OXYCODONE AND ACETAMINOPHEN 5; 325 MG/1; MG/1
1 TABLET ORAL ONCE
Refills: 0 | Status: DISCONTINUED | OUTPATIENT
Start: 2019-05-18 | End: 2019-05-18

## 2019-05-18 RX ADMIN — OXYCODONE AND ACETAMINOPHEN 1 TABLET(S): 5; 325 TABLET ORAL at 20:57

## 2019-05-18 RX ADMIN — OXYCODONE AND ACETAMINOPHEN 1 TABLET(S): 5; 325 TABLET ORAL at 18:06

## 2019-05-18 NOTE — ED PROVIDER NOTE - PHYSICAL EXAMINATION
sutures intact to nail bed with no nail present. mild swelling. + bruising to distal thumb. skin intact. limited ROM due to pain. no bony deformity. sensation intact. remainder of hand and wrist non tender.

## 2019-05-18 NOTE — CONSULT NOTE ADULT - SUBJECTIVE AND OBJECTIVE BOX
Orthopaedic Consult Note    Consult Requested by: ER  Surgeon: Alison    CC:Patient is a 48y old  Male who presents with a chief complaint of R thumb DP fracture    HPI  48yMale  c/o R thumb transverse DP fracture after fall 5 days ago. Went to OSH 4 days ago, was treated with nail bed repair, immobilization. Dc'd on ibuprophen but patient reports this did not control the pain. Denies fevers, tingling, numbness, weakness.     PAST MEDICAL & SURGICAL HISTORY:  Intractable episodic cluster headache  HTN (hypertension)    Allergies    No Known Allergies    Intolerances      MEDICATIONS  (STANDING):      Vital Signs Last 24 Hrs  T(C): 36.8 (18 May 2019 17:11), Max: 36.8 (18 May 2019 17:11)  T(F): 98.2 (18 May 2019 17:11), Max: 98.2 (18 May 2019 17:11)  HR: 87 (18 May 2019 17:11) (87 - 87)  BP: 140/93 (18 May 2019 17:11) (140/93 - 140/93)  BP(mean): --  RR: 18 (18 May 2019 17:11) (18 - 18)  SpO2: 99% (18 May 2019 17:11) (99% - 99%)    Physical Exam:  General: NAD, alert & oriented x 3      RUE  R thumb nail bed repair in appropriate stage of healing  No open injury or drainage  Good capillary return to thumb  2 point discrimination intact  Motor: 5/5 wrist flexion, extension/, palmar intrinsics/bicep/tricep/delt  Sensation: SILT med/uln/rad/musc/axillary   Pulses:  rad/brach 2+ , fingers/hand WWP        Imaging: R thumb healing DP fracture    A/P: 48yMale w/ R thumb fracture, previously treated, comes to ER due to inadequate pain control  -took down splint, washed nail bed with betadine  -applied thumb spica splint  -dc with adequate pain control  -follow up in office  Discussed with chief/attending  Ortho Pager: [phone]

## 2019-05-18 NOTE — ED PROVIDER NOTE - OBJECTIVE STATEMENT
49 y/o male with no sig PMHx c/o right thumb fx x 4 days. pt states crush injury to right thumb 4 days ago and seen at [facility]. pt notes bone reduced and nail bed repaired at that time. pt states persistent pain despite motrin. no fever or chills. no numbness, tingling or weakness. no further complaints.

## 2019-05-18 NOTE — ED PROVIDER NOTE - NSFOLLOWUPINSTRUCTIONS_ED_ALL_ED_FT
Follow up with Hand surgery this week. Follow up with Hand surgery this week.        Log Out.    "[employer]® CareNotes®     :  [facility]             HAND FRACTURE - AfterCare(R) Instructions(ER/ED)     Hand Fracture    WHAT YOU NEED TO KNOW:    A hand fracture is a break in one of the bones in your hand. This includes the bones in the wrist and fingers, and those that connect the wrist to the fingers. A hand fracture may be caused by twisting or bending the hand in the wrong way. It may also be caused by a fall, a crush injury, or a sports injury.    DISCHARGE INSTRUCTIONS:    Call your doctor if:     Your arm feels warm, tender, and painful. It may look swollen and red.      You have severe pain that does not get better, even with pain medicine.      Your injured hand or forearm is cold, numb, or pale.      Your cast or splint gets wet, damaged, or comes off.      You have new sores around your brace, cast, or splint.      You notice a bad smell coming from under your cast.      You have questions or concerns about your condition or care.    Medicines: You may need any of the following:     NSAIDs help decrease swelling and pain or fever. This medicine is available with or without a doctor's order. NSAIDs can cause stomach bleeding or kidney problems in certain people. If you take blood thinner medicine, always ask your healthcare provider if NSAIDs are safe for you. Always read the medicine label and follow directions.      Acetaminophen decreases pain and fever. It is available without a doctor's order. Ask how much to take and how often to take it. Follow directions. Read the labels of all other medicines you are using to see if they also contain acetaminophen, or ask your doctor or pharmacist. Acetaminophen can cause liver damage if not taken correctly. Do not use more than 4 grams (4,000 milligrams) total of acetaminophen in one day.       Prescription pain medicine may be given. Ask your healthcare provider how to take this medicine safely. Some prescription pain medicines contain acetaminophen. Do not take other medicines that contain acetaminophen without talking to your healthcare provider. Too much acetaminophen may cause liver damage. Prescription pain medicine may cause constipation. Ask your healthcare provider how to prevent or treat constipation.       Take your medicine as directed. Contact your healthcare provider if you think your medicine is not helping or if you have side effects. Tell him or her if you are allergic to any medicine. Keep a list of the medicines, vitamins, and herbs you take. Include the amounts, and when and why you take them. Bring the list or the pill bottles to follow-up visits. Carry your medicine list with you in case of an emergency.    Follow up with your doctor or hand specialist as directed: You may need to return to have your cast, splint, or stitches removed. Write down your questions so you remember to ask them during your visits.    Manage your symptoms:     Wear your splint as directed. Do not remove the splint until you follow up with your healthcare provider or hand specialist.      Apply ice on your hand for 15 to 20 minutes every hour or as directed. Use an ice pack, or put crushed ice in a plastic bag. Cover it with a towel before you apply it to your skin. Ice helps prevent tissue damage and decreases swelling and pain.      Elevate your hand above the level of your heart as often as you can. This will help decrease swelling and pain. Prop your hand on pillows or blankets to keep it elevated comfortably.      Go to physical therapy as directed. A physical therapist teaches you exercises to help improve movement and strength and to decrease pain.    Bathing with a cast or splint: Do not let your cast or splint get wet. Before bathing, cover the cast or splint with a plastic bag. Tape the bag to your skin above the cast or splint to seal out the water. Keep your hand out of the water in case the bag leaks. Follow instructions about when it is okay to take a bath or shower.    Cast or splint care:     Check the skin around the cast or splint for redness or sores every day.      Do not push down or lean on any part of the cast or splint because it may break.      Do not use a sharp or pointed object to scratch your skin under the cast or splint.    Activity: You may not be able to drive for up to 2 weeks. Ask when it is safe for you to drive and return to other activities, such as sports.       © Copyright [employer] 2019 All illustrations and images included in CareNotes are the copyrighted property of [employer] Inc[employer] or [employer].      back to top                      © Copyright [employer] 2019

## 2019-05-18 NOTE — ED PROVIDER NOTE - CLINICAL SUMMARY MEDICAL DECISION MAKING FREE TEXT BOX
fracture to right thumb. neurovascular intact. no evidence of infection. pain meds given. x-rays + fx. ortho consulted and splinted pt in ED. recommend f/u as outpt. pt currently on augmentin.

## 2019-05-18 NOTE — ED ADULT NURSE REASSESSMENT NOTE - NS ED NURSE REASSESS COMMENT FT1
Sling and splint in place to right arm, good neurovascular s/s fingers of right hand, vital signs stable, no pain complaint, discharged to home in stable condition.

## 2019-05-18 NOTE — ED ADULT NURSE NOTE - OBJECTIVE STATEMENT
pt to ER w/ report of injuring R thumb four days ago when door slammed on it.  Pt seen and tx'd at [facility], w/ splint applied and instructions to leave splint on for 7 days.  Pt reports pain in L thumb uncontrolled by ibuprofen.  Pt denies cp/sob/f/c/n/v. Breathing unlabored, skin warm and dry. Will continue to monitor.

## 2019-05-18 NOTE — ED PROVIDER NOTE - CARE PROVIDER_API CALL
Ham Leonard)  Orthopaedic Surgery  [address] [place] [address]  Phone: [phone]  Fax: [phone]  Follow Up Time:     Kaushik Rosas)  Surgery; Surgery of the Hand  [facility] [place] [address]  Phone: [phone]  Fax: [phone]  Follow Up Time:

## 2019-05-26 ENCOUNTER — EMERGENCY (EMERGENCY)
Facility: HOSPITAL | Age: 49
LOS: 1 days | Discharge: HOME | End: 2019-05-26
Attending: EMERGENCY MEDICINE | Admitting: EMERGENCY MEDICINE
Payer: MEDICARE

## 2019-05-26 VITALS
DIASTOLIC BLOOD PRESSURE: 78 MMHG | HEART RATE: 53 BPM | RESPIRATION RATE: 18 BRPM | SYSTOLIC BLOOD PRESSURE: 135 MMHG | TEMPERATURE: 99 F | OXYGEN SATURATION: 98 %

## 2019-05-26 VITALS
HEIGHT: 75 IN | OXYGEN SATURATION: 99 % | HEART RATE: 85 BPM | RESPIRATION RATE: 20 BRPM | SYSTOLIC BLOOD PRESSURE: 167 MMHG | DIASTOLIC BLOOD PRESSURE: 106 MMHG | TEMPERATURE: 98 F | WEIGHT: 173.06 LBS

## 2019-05-26 DIAGNOSIS — Z79.52 LONG TERM (CURRENT) USE OF SYSTEMIC STEROIDS: ICD-10-CM

## 2019-05-26 DIAGNOSIS — Z79.899 OTHER LONG TERM (CURRENT) DRUG THERAPY: ICD-10-CM

## 2019-05-26 DIAGNOSIS — R06.02 SHORTNESS OF BREATH: ICD-10-CM

## 2019-05-26 DIAGNOSIS — R07.9 CHEST PAIN, UNSPECIFIED: ICD-10-CM

## 2019-05-26 DIAGNOSIS — K52.9 NONINFECTIVE GASTROENTERITIS AND COLITIS, UNSPECIFIED: ICD-10-CM

## 2019-05-26 DIAGNOSIS — I10 ESSENTIAL (PRIMARY) HYPERTENSION: ICD-10-CM

## 2019-05-26 DIAGNOSIS — F17.200 NICOTINE DEPENDENCE, UNSPECIFIED, UNCOMPLICATED: ICD-10-CM

## 2019-05-26 DIAGNOSIS — Z79.891 LONG TERM (CURRENT) USE OF OPIATE ANALGESIC: ICD-10-CM

## 2019-05-26 DIAGNOSIS — Z79.01 LONG TERM (CURRENT) USE OF ANTICOAGULANTS: ICD-10-CM

## 2019-05-26 LAB
ALBUMIN SERPL ELPH-MCNC: 4.3 G/DL — SIGNIFICANT CHANGE UP (ref 3.3–5)
ALP SERPL-CCNC: 79 U/L — SIGNIFICANT CHANGE UP (ref 40–120)
ALT FLD-CCNC: 14 U/L — SIGNIFICANT CHANGE UP (ref 10–45)
ANION GAP SERPL CALC-SCNC: 10 MMOL/L — SIGNIFICANT CHANGE UP (ref 5–17)
APTT BLD: 36.6 SEC — HIGH (ref 27.5–36.3)
AST SERPL-CCNC: 15 U/L — SIGNIFICANT CHANGE UP (ref 10–40)
BASOPHILS # BLD AUTO: 0.07 K/UL — SIGNIFICANT CHANGE UP (ref 0–0.2)
BASOPHILS NFR BLD AUTO: 1.1 % — SIGNIFICANT CHANGE UP (ref 0–2)
BILIRUB SERPL-MCNC: 0.2 MG/DL — SIGNIFICANT CHANGE UP (ref 0.2–1.2)
BUN SERPL-MCNC: 16 MG/DL — SIGNIFICANT CHANGE UP (ref 7–23)
CALCIUM SERPL-MCNC: 9.2 MG/DL — SIGNIFICANT CHANGE UP (ref 8.4–10.5)
CHLORIDE SERPL-SCNC: 106 MMOL/L — SIGNIFICANT CHANGE UP (ref 96–108)
CK MB CFR SERPL CALC: <1 NG/ML — SIGNIFICANT CHANGE UP (ref 0–6.7)
CK SERPL-CCNC: 107 U/L — SIGNIFICANT CHANGE UP (ref 30–200)
CO2 SERPL-SCNC: 27 MMOL/L — SIGNIFICANT CHANGE UP (ref 22–31)
CREAT SERPL-MCNC: 0.9 MG/DL — SIGNIFICANT CHANGE UP (ref 0.5–1.3)
EOSINOPHIL # BLD AUTO: 0.34 K/UL — SIGNIFICANT CHANGE UP (ref 0–0.5)
EOSINOPHIL NFR BLD AUTO: 5.1 % — SIGNIFICANT CHANGE UP (ref 0–6)
GLUCOSE SERPL-MCNC: 97 MG/DL — SIGNIFICANT CHANGE UP (ref 70–99)
HCT VFR BLD CALC: 41.1 % — SIGNIFICANT CHANGE UP (ref 39–50)
HGB BLD-MCNC: 13.8 G/DL — SIGNIFICANT CHANGE UP (ref 13–17)
IMM GRANULOCYTES NFR BLD AUTO: 0.2 % — SIGNIFICANT CHANGE UP (ref 0–1.5)
INR BLD: 1.38 — HIGH (ref 0.88–1.16)
LYMPHOCYTES # BLD AUTO: 2.64 K/UL — SIGNIFICANT CHANGE UP (ref 1–3.3)
LYMPHOCYTES # BLD AUTO: 39.8 % — SIGNIFICANT CHANGE UP (ref 13–44)
MCHC RBC-ENTMCNC: 29.2 PG — SIGNIFICANT CHANGE UP (ref 27–34)
MCHC RBC-ENTMCNC: 33.6 GM/DL — SIGNIFICANT CHANGE UP (ref 32–36)
MCV RBC AUTO: 87.1 FL — SIGNIFICANT CHANGE UP (ref 80–100)
MONOCYTES # BLD AUTO: 0.47 K/UL — SIGNIFICANT CHANGE UP (ref 0–0.9)
MONOCYTES NFR BLD AUTO: 7.1 % — SIGNIFICANT CHANGE UP (ref 2–14)
NEUTROPHILS # BLD AUTO: 3.1 K/UL — SIGNIFICANT CHANGE UP (ref 1.8–7.4)
NEUTROPHILS NFR BLD AUTO: 46.7 % — SIGNIFICANT CHANGE UP (ref 43–77)
NRBC # BLD: 0 /100 WBCS — SIGNIFICANT CHANGE UP (ref 0–0)
PLATELET # BLD AUTO: 421 K/UL — HIGH (ref 150–400)
POTASSIUM SERPL-MCNC: 3.8 MMOL/L — SIGNIFICANT CHANGE UP (ref 3.5–5.3)
POTASSIUM SERPL-SCNC: 3.8 MMOL/L — SIGNIFICANT CHANGE UP (ref 3.5–5.3)
PROT SERPL-MCNC: 7.1 G/DL — SIGNIFICANT CHANGE UP (ref 6–8.3)
PROTHROM AB SERPL-ACNC: 15.8 SEC — HIGH (ref 10–12.9)
RBC # BLD: 4.72 M/UL — SIGNIFICANT CHANGE UP (ref 4.2–5.8)
RBC # FLD: 13.5 % — SIGNIFICANT CHANGE UP (ref 10.3–14.5)
SODIUM SERPL-SCNC: 143 MMOL/L — SIGNIFICANT CHANGE UP (ref 135–145)
TROPONIN T SERPL-MCNC: <0.01 NG/ML — SIGNIFICANT CHANGE UP (ref 0–0.01)
WBC # BLD: 6.63 K/UL — SIGNIFICANT CHANGE UP (ref 3.8–10.5)
WBC # FLD AUTO: 6.63 K/UL — SIGNIFICANT CHANGE UP (ref 3.8–10.5)

## 2019-05-26 PROCEDURE — 71045 X-RAY EXAM CHEST 1 VIEW: CPT | Mod: 26

## 2019-05-26 PROCEDURE — 93005 ELECTROCARDIOGRAM TRACING: CPT

## 2019-05-26 PROCEDURE — 71275 CT ANGIOGRAPHY CHEST: CPT | Mod: 26

## 2019-05-26 PROCEDURE — 82553 CREATINE MB FRACTION: CPT

## 2019-05-26 PROCEDURE — 80053 COMPREHEN METABOLIC PANEL: CPT

## 2019-05-26 PROCEDURE — 96365 THER/PROPH/DIAG IV INF INIT: CPT | Mod: XU

## 2019-05-26 PROCEDURE — 74174 CTA ABD&PLVS W/CONTRAST: CPT | Mod: 26

## 2019-05-26 PROCEDURE — 71045 X-RAY EXAM CHEST 1 VIEW: CPT

## 2019-05-26 PROCEDURE — 85730 THROMBOPLASTIN TIME PARTIAL: CPT

## 2019-05-26 PROCEDURE — 85025 COMPLETE CBC W/AUTO DIFF WBC: CPT

## 2019-05-26 PROCEDURE — 74174 CTA ABD&PLVS W/CONTRAST: CPT

## 2019-05-26 PROCEDURE — 85610 PROTHROMBIN TIME: CPT

## 2019-05-26 PROCEDURE — 36415 COLL VENOUS BLD VENIPUNCTURE: CPT

## 2019-05-26 PROCEDURE — 84484 ASSAY OF TROPONIN QUANT: CPT

## 2019-05-26 PROCEDURE — 96375 TX/PRO/DX INJ NEW DRUG ADDON: CPT

## 2019-05-26 PROCEDURE — 99284 EMERGENCY DEPT VISIT MOD MDM: CPT | Mod: 25

## 2019-05-26 PROCEDURE — 74160 CT ABDOMEN W/CONTRAST: CPT

## 2019-05-26 PROCEDURE — 93010 ELECTROCARDIOGRAM REPORT: CPT

## 2019-05-26 PROCEDURE — 71275 CT ANGIOGRAPHY CHEST: CPT

## 2019-05-26 PROCEDURE — 99285 EMERGENCY DEPT VISIT HI MDM: CPT | Mod: 25

## 2019-05-26 PROCEDURE — 82550 ASSAY OF CK (CPK): CPT

## 2019-05-26 RX ORDER — METRONIDAZOLE 500 MG
500 TABLET ORAL ONCE
Refills: 0 | Status: COMPLETED | OUTPATIENT
Start: 2019-05-26 | End: 2019-05-26

## 2019-05-26 RX ORDER — METRONIDAZOLE 500 MG
1 TABLET ORAL
Qty: 30 | Refills: 0
Start: 2019-05-26 | End: 2019-06-04

## 2019-05-26 RX ORDER — CEFTRIAXONE 500 MG/1
1 INJECTION, POWDER, FOR SOLUTION INTRAMUSCULAR; INTRAVENOUS ONCE
Refills: 0 | Status: COMPLETED | OUTPATIENT
Start: 2019-05-26 | End: 2019-05-26

## 2019-05-26 RX ORDER — CEFPODOXIME PROXETIL 100 MG
1 TABLET ORAL
Qty: 20 | Refills: 0
Start: 2019-05-26 | End: 2019-06-04

## 2019-05-26 RX ADMIN — CEFTRIAXONE 1 GRAM(S): 500 INJECTION, POWDER, FOR SOLUTION INTRAMUSCULAR; INTRAVENOUS at 20:15

## 2019-05-26 RX ADMIN — Medication 100 MILLIGRAM(S): at 20:15

## 2019-05-26 RX ADMIN — CEFTRIAXONE 100 GRAM(S): 500 INJECTION, POWDER, FOR SOLUTION INTRAMUSCULAR; INTRAVENOUS at 19:41

## 2019-05-26 NOTE — ED PROVIDER NOTE - NSFOLLOWUPINSTRUCTIONS_ED_ALL_ED_FT
Colitis  Image   Colitis is inflammation of the colon. Colitis may last a short time (acute) or it may last a long time (chronic).    What are the causes?  This condition may be caused by:  Viruses.  Bacteria.  Reactions to medicine.  Certain autoimmune diseases, such as Crohn disease or ulcerative colitis.  What are the signs or symptoms?  Symptoms of this condition include:  Diarrhea.  Passing bloody or tarry stool.  Pain.  Fever.  Vomiting.  Tiredness (fatigue).  Weight loss.  Bloating.  Sudden increase in abdominal pain.  Having fewer bowel movements than usual.  How is this diagnosed?  This condition is diagnosed with a stool test or a blood test. You may also have other tests, including X-rays, a CT scan, or a colonoscopy.    How is this treated?  Treatment may include:  Resting the bowel. This involves not eating or drinking for a period of time.  Fluids that are given through an IV tube.  Medicine for pain and diarrhea.  Antibiotic medicines.  Cortisone medicines.  Surgery.  Follow these instructions at home:  Eating and drinking     Follow instructions from your health care provider about eating or drinking restrictions.  Drink enough fluid to keep your urine clear or pale yellow.  Work with a dietitian to determine which foods cause your condition to flare up.  Avoid foods that cause flare-ups.  Eat a well-balanced diet.  Medicines     Take over-the-counter and prescription medicines only as told by your health care provider.  If you were prescribed an antibiotic medicine, take it as told by your health care provider. Do not stop taking the antibiotic even if you start to feel better.  General instructions     Keep all follow-up visits as told by your health care provider. This is important.  Contact a health care provider if:  Your symptoms do not go away.  You develop new symptoms.  Get help right away if:  You have a fever that does not go away with treatment.  You develop chills.  You have extreme weakness, fainting, or dehydration.  You have repeated vomiting.  You develop severe pain in your abdomen.  You pass bloody or tarry stool.  This information is not intended to replace advice given to you by your health care provider. Make sure you discuss any questions you have with your health care provider.

## 2019-05-26 NOTE — ED ADULT TRIAGE NOTE - CHIEF COMPLAINT QUOTE
Patient, speaking in full sentences, states, "I have had gas problems for a long time, and I get shortness of breath."  Patient complaining of SOB, worsening for one week, sharp intermittent abdominal pain with radiation to chest, and N/V/D.  Patient denies any fevers, chills or urinary symptoms.  EKG in progress.

## 2019-05-26 NOTE — ED PROVIDER NOTE - PROGRESS NOTE DETAILS
bradycardia -mid 50s- noted in ED and upon DC no sob or cardiac sx ambulatory -no EKG changes-  was 70 on arrival

## 2019-05-26 NOTE — ED ADULT NURSE NOTE - OBJECTIVE STATEMENT
Pt alert and oriented X3. Pt coming from home due to severe upper /center abd pain that increases with respirations and "kicks my heart." Pt States he has been having SOB for about 2 years that is becoming increasingly worst. pt also states he vomits/coughs up blood at time. Pt denies any fever, or dizziness. Pt alert and oriented X3. Pt coming from home due to severe upper /center abd pain that increases with respirations and "kicks my heart." Pt States he has been having SOB for about 2 years that is becoming increasingly worst. pt also states he vomits/coughs up blood at time. Pt denies any fever, or dizziness. pt states he is on xarelto due to past portal thrombosis

## 2019-05-26 NOTE — ED PROVIDER NOTE - CARE PROVIDER_API CALL
Emanuel Dyer)  [facility]  [address], [address]  [place] [address]  Phone: [phone]  Fax: [phone]  Follow Up Time: 1-3 Days

## 2019-05-26 NOTE — ED ADULT NURSE NOTE - SKIN INTEGRITY
right thumb race, with ace wrap to right hand intact, able to move fingers, less 3 seconds capillary refill, palpable radial pulse/intact

## 2019-05-26 NOTE — ED PROVIDER NOTE - CLINICAL SUMMARY MEDICAL DECISION MAKING FREE TEXT BOX
colitis / ascending  transverse   no dissection labs  wnl  offered admission pt prefers to be discharged home 47 yo male with colitis / ascending  transverse -  no dissection labs  wnl  offered admission pt prefers to be discharged home well appearing  no fevers  no N/V - selina po in ED ambulating easily smiling

## 2019-05-26 NOTE — ED PROVIDER NOTE - OBJECTIVE STATEMENT
49 yo male with hx of recent right thumb fx 2 weeks ago hx of portal vein thrombosis on Xarelto arrives with elevated blood pressure - abd pain radiating to back and chest and sob  x 1 week-  no N/V or diaphoresis  no leg pain or calf tenderness or swelling  no fevers - no pleuritic CP no hemoptysis

## 2019-05-26 NOTE — ED ADULT NURSE NOTE - CHPI ED NUR SYMPTOMS NEG
no edema/no headache/no diaphoresis/no fever/no chills/no chest pain/no shortness of breath/no hemoptysis/no body aches/no wheezing

## 2019-05-26 NOTE — ED PROVIDER NOTE - CARE PLAN
Principal Discharge DX:	Abdominal pain  Secondary Diagnosis:	Chest pain Principal Discharge DX:	Colitis  Secondary Diagnosis:	Chest pain

## 2019-05-29 ENCOUNTER — APPOINTMENT (OUTPATIENT)
Dept: INTERNAL MEDICINE | Facility: CLINIC | Age: 49
End: 2019-05-29

## 2019-06-11 ENCOUNTER — APPOINTMENT (OUTPATIENT)
Dept: INTERNAL MEDICINE | Facility: CLINIC | Age: 49
End: 2019-06-11
Payer: MEDICARE

## 2019-06-11 VITALS
BODY MASS INDEX: 20.27 KG/M2 | TEMPERATURE: 97.9 F | HEIGHT: 75 IN | HEART RATE: 86 BPM | SYSTOLIC BLOOD PRESSURE: 132 MMHG | WEIGHT: 163 LBS | OXYGEN SATURATION: 98 % | DIASTOLIC BLOOD PRESSURE: 89 MMHG

## 2019-06-11 DIAGNOSIS — S62.509A FRACTURE OF UNSPECIFIED PHALANX OF UNSPECIFIED THUMB, INITIAL ENCOUNTER FOR CLOSED FRACTURE: ICD-10-CM

## 2019-06-11 DIAGNOSIS — I81 PORTAL VEIN THROMBOSIS: ICD-10-CM

## 2019-06-11 DIAGNOSIS — R93.0 ABNORMAL FINDINGS ON DIAGNOSTIC IMAGING OF SKULL AND HEAD, NOT ELSEWHERE CLASSIFIED: ICD-10-CM

## 2019-06-11 PROCEDURE — 99214 OFFICE O/P EST MOD 30 MIN: CPT | Mod: GC

## 2019-06-11 RX ORDER — OXYCODONE AND ACETAMINOPHEN 5; 325 MG/1; MG/1
5-325 TABLET ORAL
Qty: 28 | Refills: 0 | Status: ACTIVE | COMMUNITY
Start: 2019-06-11 | End: 1900-01-01

## 2019-06-12 PROBLEM — R93.0: Status: ACTIVE | Noted: 2019-06-12

## 2019-06-19 ENCOUNTER — RX RENEWAL (OUTPATIENT)
Age: 49
End: 2019-06-19

## 2019-06-22 ENCOUNTER — EMERGENCY (EMERGENCY)
Facility: HOSPITAL | Age: 49
LOS: 1 days | Discharge: HOME | End: 2019-06-22
Attending: EMERGENCY MEDICINE | Admitting: EMERGENCY MEDICINE
Payer: MEDICARE

## 2019-06-22 VITALS
SYSTOLIC BLOOD PRESSURE: 162 MMHG | TEMPERATURE: 97 F | OXYGEN SATURATION: 97 % | HEART RATE: 83 BPM | DIASTOLIC BLOOD PRESSURE: 83 MMHG | RESPIRATION RATE: 18 BRPM

## 2019-06-22 VITALS
HEIGHT: 75 IN | OXYGEN SATURATION: 96 % | RESPIRATION RATE: 18 BRPM | WEIGHT: 162.92 LBS | DIASTOLIC BLOOD PRESSURE: 97 MMHG | TEMPERATURE: 98 F | HEART RATE: 62 BPM | SYSTOLIC BLOOD PRESSURE: 165 MMHG

## 2019-06-22 LAB
ANION GAP SERPL CALC-SCNC: 11 MMOL/L — SIGNIFICANT CHANGE UP (ref 5–17)
APTT BLD: 31.4 SEC — SIGNIFICANT CHANGE UP (ref 27.5–36.3)
BASOPHILS # BLD AUTO: 0.04 K/UL — SIGNIFICANT CHANGE UP (ref 0–0.2)
BASOPHILS NFR BLD AUTO: 0.7 % — SIGNIFICANT CHANGE UP (ref 0–2)
BUN SERPL-MCNC: 18 MG/DL — SIGNIFICANT CHANGE UP (ref 7–23)
CALCIUM SERPL-MCNC: 9.1 MG/DL — SIGNIFICANT CHANGE UP (ref 8.4–10.5)
CHLORIDE SERPL-SCNC: 110 MMOL/L — HIGH (ref 96–108)
CO2 SERPL-SCNC: 28 MMOL/L — SIGNIFICANT CHANGE UP (ref 22–31)
CREAT SERPL-MCNC: 1.09 MG/DL — SIGNIFICANT CHANGE UP (ref 0.5–1.3)
EOSINOPHIL # BLD AUTO: 0.32 K/UL — SIGNIFICANT CHANGE UP (ref 0–0.5)
EOSINOPHIL NFR BLD AUTO: 5.6 % — SIGNIFICANT CHANGE UP (ref 0–6)
GLUCOSE SERPL-MCNC: 139 MG/DL — HIGH (ref 70–99)
HCT VFR BLD CALC: 42 % — SIGNIFICANT CHANGE UP (ref 39–50)
HGB BLD-MCNC: 13.1 G/DL — SIGNIFICANT CHANGE UP (ref 13–17)
IMM GRANULOCYTES NFR BLD AUTO: 0.2 % — SIGNIFICANT CHANGE UP (ref 0–1.5)
INR BLD: 1.12 — SIGNIFICANT CHANGE UP (ref 0.88–1.16)
LYMPHOCYTES # BLD AUTO: 3.05 K/UL — SIGNIFICANT CHANGE UP (ref 1–3.3)
LYMPHOCYTES # BLD AUTO: 53 % — HIGH (ref 13–44)
MCHC RBC-ENTMCNC: 28.3 PG — SIGNIFICANT CHANGE UP (ref 27–34)
MCHC RBC-ENTMCNC: 31.2 GM/DL — LOW (ref 32–36)
MCV RBC AUTO: 90.7 FL — SIGNIFICANT CHANGE UP (ref 80–100)
MONOCYTES # BLD AUTO: 0.6 K/UL — SIGNIFICANT CHANGE UP (ref 0–0.9)
MONOCYTES NFR BLD AUTO: 10.4 % — SIGNIFICANT CHANGE UP (ref 2–14)
NEUTROPHILS # BLD AUTO: 1.74 K/UL — LOW (ref 1.8–7.4)
NEUTROPHILS NFR BLD AUTO: 30.1 % — LOW (ref 43–77)
NRBC # BLD: 0 /100 WBCS — SIGNIFICANT CHANGE UP (ref 0–0)
PLATELET # BLD AUTO: 314 K/UL — SIGNIFICANT CHANGE UP (ref 150–400)
POTASSIUM SERPL-MCNC: 3.5 MMOL/L — SIGNIFICANT CHANGE UP (ref 3.5–5.3)
POTASSIUM SERPL-SCNC: 3.5 MMOL/L — SIGNIFICANT CHANGE UP (ref 3.5–5.3)
PROTHROM AB SERPL-ACNC: 12.7 SEC — SIGNIFICANT CHANGE UP (ref 10–12.9)
RBC # BLD: 4.63 M/UL — SIGNIFICANT CHANGE UP (ref 4.2–5.8)
RBC # FLD: 14.1 % — SIGNIFICANT CHANGE UP (ref 10.3–14.5)
SODIUM SERPL-SCNC: 149 MMOL/L — HIGH (ref 135–145)
WBC # BLD: 5.76 K/UL — SIGNIFICANT CHANGE UP (ref 3.8–10.5)
WBC # FLD AUTO: 5.76 K/UL — SIGNIFICANT CHANGE UP (ref 3.8–10.5)

## 2019-06-22 PROCEDURE — 99284 EMERGENCY DEPT VISIT MOD MDM: CPT | Mod: 25

## 2019-06-22 PROCEDURE — 96372 THER/PROPH/DIAG INJ SC/IM: CPT | Mod: XU

## 2019-06-22 PROCEDURE — 96375 TX/PRO/DX INJ NEW DRUG ADDON: CPT

## 2019-06-22 PROCEDURE — 80048 BASIC METABOLIC PNL TOTAL CA: CPT

## 2019-06-22 PROCEDURE — 85730 THROMBOPLASTIN TIME PARTIAL: CPT

## 2019-06-22 PROCEDURE — 85025 COMPLETE CBC W/AUTO DIFF WBC: CPT

## 2019-06-22 PROCEDURE — 85610 PROTHROMBIN TIME: CPT

## 2019-06-22 PROCEDURE — 96374 THER/PROPH/DIAG INJ IV PUSH: CPT

## 2019-06-22 PROCEDURE — 36415 COLL VENOUS BLD VENIPUNCTURE: CPT

## 2019-06-22 PROCEDURE — 96361 HYDRATE IV INFUSION ADD-ON: CPT

## 2019-06-22 RX ORDER — PROCHLORPERAZINE MALEATE 5 MG
10 TABLET ORAL ONCE
Refills: 0 | Status: COMPLETED | OUTPATIENT
Start: 2019-06-22 | End: 2019-06-22

## 2019-06-22 RX ORDER — DIPHENHYDRAMINE HCL 50 MG
25 CAPSULE ORAL ONCE
Refills: 0 | Status: COMPLETED | OUTPATIENT
Start: 2019-06-22 | End: 2019-06-22

## 2019-06-22 RX ORDER — SODIUM CHLORIDE 9 MG/ML
1000 INJECTION INTRAMUSCULAR; INTRAVENOUS; SUBCUTANEOUS ONCE
Refills: 0 | Status: COMPLETED | OUTPATIENT
Start: 2019-06-22 | End: 2019-06-22

## 2019-06-22 RX ORDER — MORPHINE SULFATE 50 MG/1
4 CAPSULE, EXTENDED RELEASE ORAL ONCE
Refills: 0 | Status: DISCONTINUED | OUTPATIENT
Start: 2019-06-22 | End: 2019-06-22

## 2019-06-22 RX ORDER — KETOROLAC TROMETHAMINE 30 MG/ML
15 SYRINGE (ML) INJECTION ONCE
Refills: 0 | Status: DISCONTINUED | OUTPATIENT
Start: 2019-06-22 | End: 2019-06-22

## 2019-06-22 RX ADMIN — SODIUM CHLORIDE 1000 MILLILITER(S): 9 INJECTION INTRAMUSCULAR; INTRAVENOUS; SUBCUTANEOUS at 10:00

## 2019-06-22 RX ADMIN — MORPHINE SULFATE 4 MILLIGRAM(S): 50 CAPSULE, EXTENDED RELEASE ORAL at 10:00

## 2019-06-22 RX ADMIN — Medication 15 MILLIGRAM(S): at 08:47

## 2019-06-22 RX ADMIN — SODIUM CHLORIDE 1000 MILLILITER(S): 9 INJECTION INTRAMUSCULAR; INTRAVENOUS; SUBCUTANEOUS at 09:13

## 2019-06-22 RX ADMIN — Medication 25 MILLIGRAM(S): at 08:47

## 2019-06-22 RX ADMIN — SODIUM CHLORIDE 1000 MILLILITER(S): 9 INJECTION INTRAMUSCULAR; INTRAVENOUS; SUBCUTANEOUS at 08:32

## 2019-06-22 RX ADMIN — Medication 10 MILLIGRAM(S): at 08:47

## 2019-06-22 RX ADMIN — MORPHINE SULFATE 4 MILLIGRAM(S): 50 CAPSULE, EXTENDED RELEASE ORAL at 09:45

## 2019-06-22 RX ADMIN — Medication 15 MILLIGRAM(S): at 09:02

## 2019-06-22 RX ADMIN — SODIUM CHLORIDE 1000 MILLILITER(S): 9 INJECTION INTRAMUSCULAR; INTRAVENOUS; SUBCUTANEOUS at 08:48

## 2019-06-22 NOTE — ED ADULT NURSE NOTE - NSIMPLEMENTINTERV_GEN_ALL_ED
Implemented All Universal Safety Interventions:  [place] to call system. Call bell, personal items and telephone within reach. Instruct patient to call for assistance. Room bathroom lighting operational. Non-slip footwear when patient is off stretcher. Physically safe environment: no spills, clutter or unnecessary equipment. Stretcher in lowest position, wheels locked, appropriate side rails in place.

## 2019-06-22 NOTE — ED PROVIDER NOTE - NEUROLOGICAL, MLM
Alert and oriented, no focal deficits, no motor or sensory deficits.  5/5 strength x 4 extremities against gravity and external force.

## 2019-06-22 NOTE — ED PROVIDER NOTE - CLINICAL SUMMARY MEDICAL DECISION MAKING FREE TEXT BOX
Patient in ED w concern for migraine HA - symptoms typical of what he has experienced with migraines in the past.  No focal neurologic deficits on exam, awake and alert, speaking clearly.  Visibly uncomfortable on ED arrival.  GIven IVF, compazine, toradal, benadryl and morphine with improvement in symptoms.  Patient was previously given neurology follow up and has planned appointment for next week.  Patient is advised to follow up as previously planned and will return to ED immediately should he have any concern prior to this recommended follow up.  Patient is aware of plan and verbalizes his understanding.

## 2019-06-22 NOTE — ED PROVIDER NOTE - OBJECTIVE STATEMENT
48 year old male with history of migraine HA presents to ED with concern for migraine HA today.  Patient states this is a typical manifestation of his migraine HAs and notes associated photophobia and "scorching pain" throughout his head.  + Eye tearing.  He denies associated fever, chills, neck pain/stiffness, chest pain, shortness of breath, abdominal pain, nausea, emesis, changes to bowel movements, rashes, peripheral edema or any additional acute complaints or concerns at this time.

## 2019-06-22 NOTE — ED PROVIDER NOTE - EYES, MLM
Clear bilaterally, pupils equal, round and reactive to light.  EOMI.  No proptosis.  +Tearing, bilaterally.

## 2019-06-22 NOTE — ED PROVIDER NOTE - NSFOLLOWUPINSTRUCTIONS_ED_ALL_ED_FT
Please follow up with your primary physician in 1-2 days for re evaluation.  Please return to ER immediately should your symptoms worsen or if you have any concern prior to this recommended follow up.     :  [facility]             MIGRAINE HEADACHE - AfterCare(R) Instructions(ER/ED)     Migraine Headache    WHAT YOU NEED TO KNOW:    A migraine is a severe headache. The pain can be so severe that it interferes with your daily activities. A migraine can last a few hours up to several days. The exact cause of migraines is not known.     DISCHARGE INSTRUCTIONS:    Return to the emergency department if:     You have a headache that seems different or much worse than your usual migraine headache.      You have a severe headache with a fever or a stiff neck.       You have new problems with speech, vision, balance, or movement.      You feel like you are going to faint, you become confused, or you have a seizure.     Contact your healthcare provider or neurologist if:     Your migraines interfere with your daily activities.       Your medicines or treatments stop working.      You have questions or concerns about your condition or care.    Medicines: You may need any of the following. Take medicine as soon as you feel a migraine begin.    Prescription pain medicine may be given. Do not wait until the pain is severe before you take your medicine.       Migraine medicines are used to help prevent a migraine or stop it once it starts.       Antinausea medicine may be given to calm your stomach and to help prevent vomiting. This medicine can also help relieve pain.      Take your medicine as directed. Contact your healthcare provider if you think your medicine is not helping or if you have side effects. Tell him or her if you are allergic to any medicine. Keep a list of the medicines, vitamins, and herbs you take. Include the amounts, and when and why you take them. Bring the list or the pill bottles to follow-up visits. Carry your medicine list with you in case of an emergency.    Manage your symptoms:     Rest in a dark, quiet room. This will help decrease your pain. Sleep may also help relieve the pain.      Apply ice to decrease pain. Use an ice pack, or put crushed ice in a plastic bag. Cover the ice pack with a towel and place it on your head. Apply ice for 15 to 20 minutes every hour.      Apply heat to decrease pain and muscle spasms. Use a small towel dampened with warm water or a heating pad, or sit in a warm bath. Apply heat on the area for 20 to 30 minutes every 2 hours. You may alternate heat and ice.      Keep a migraine record. Write down when your migraines start and stop. Include your symptoms and what you were doing when a migraine began. Record what you ate or drank for 24 hours before the migraine started. Keep track of what you did to treat your migraine and if it worked. Bring the migraine record with you to visits with your healthcare provider.    Follow up with your healthcare provider or neurologist as directed: Bring your migraine record with you. Write down your questions so you remember to ask them during your visits.    Prevent another migraine:     Do not smoke. Nicotine and other chemicals in cigarettes and cigars can trigger a migraine or make it worse. Ask your healthcare provider for information if you currently smoke and need help to quit. E-cigarettes or smokeless tobacco still contain nicotine. Talk to your healthcare provider before you use these products.       Do not drink alcohol. Alcohol can trigger a migraine. It can also keep medicines used to treat your migraines from working.      Get regular exercise. Exercise may help prevent migraines. Talk to your healthcare provider about the best exercise plan for you. Try to get at least 30 minutes of exercise on most days.      Manage stress. Stress may trigger a migraine. Learn new ways to relax, such as deep breathing.      Create a sleep schedule. Go to bed and get up at the same times each day. Do not watch television before bed.      Eat regular meals. Include healthy foods such as include fruit, vegetables, whole-grain breads, low-fat dairy products, beans, lean meat, and fish. Do not have food or drinks that trigger your migraines.         © Copyright [employer] 2019 All illustrations and images included in CareNotes are the copyrighted property of [employer] or [employer].      back to top                      © Copyright [employer] 2019

## 2019-06-22 NOTE — ED ADULT NURSE NOTE - NSSUSCREENINGQ3_ED_ALL_ED
Cancer Center Progress Note    Patient Name: Sparkle Dodd   YOB: 1967   Medical Record Number: [id number]   Attending Physician: Gerardo Ball M.D. Chief Complaint:  Metastatic adenocarcinoma of the lung, brain metastasis.     History of [facility])    • Depression    • Exposure to radiation     DISCONTINUED AUG 2015    • Gallbladder disease    • Hepatitis C    • High blood pressure    • Hypothyroid    • Lung cancer [facility])    • Migraines    • Osteoarthritis    • Pancreatic cancer ([facility].) JULY 2015 Current Medications:    Current Outpatient Prescriptions:   •  OxyCODONE HCl ER 20 MG Oral Tablet Extended Release 12 hour Abuse-Deterrent, Take 1 tablet (20 mg total) by mouth Q12H., Disp: 60 tablet, Rfl: 0  •  OxyCODONE HCl IR 15 MG Oral Tab, Jonathan Kingstonoast GFRNAA 57 (L) 07/07/2017   GFRAA >60 07/07/2017   CA 8.6 07/07/2017   OSMOCALC 282 07/07/2017   ALKPHO 117 (H) 07/07/2017   AST 16 07/07/2017   ALT 21 07/07/2017   ALKPHOS 131 (H) 09/21/2016   BILT 0.4 07/07/2017   TP 7.4 07/07/2017   ALB 3.3 (L) 07/07/2 nivolumab. She is on thyroid replacement--we will continue to follow her thyroid tests on treatment. He has been nonadherent to her levothyroxine. Endocrinology is managing this  Thensandy Lea is following with the pain service with regard to her arthralgias. No

## 2019-06-25 ENCOUNTER — APPOINTMENT (OUTPATIENT)
Dept: INTERNAL MEDICINE | Facility: CLINIC | Age: 49
End: 2019-06-25
Payer: MEDICARE

## 2019-06-25 ENCOUNTER — APPOINTMENT (OUTPATIENT)
Dept: GASTROENTEROLOGY | Facility: CLINIC | Age: 49
End: 2019-06-25
Payer: MEDICARE

## 2019-06-25 VITALS
WEIGHT: 170 LBS | OXYGEN SATURATION: 98 % | BODY MASS INDEX: 21.14 KG/M2 | DIASTOLIC BLOOD PRESSURE: 80 MMHG | HEART RATE: 76 BPM | HEIGHT: 75 IN | SYSTOLIC BLOOD PRESSURE: 130 MMHG | RESPIRATION RATE: 14 BRPM

## 2019-06-25 VITALS
TEMPERATURE: 98.3 F | HEIGHT: 75 IN | DIASTOLIC BLOOD PRESSURE: 97 MMHG | SYSTOLIC BLOOD PRESSURE: 151 MMHG | WEIGHT: 170 LBS | HEART RATE: 67 BPM | OXYGEN SATURATION: 99 % | BODY MASS INDEX: 21.14 KG/M2

## 2019-06-25 DIAGNOSIS — K52.9 NONINFECTIVE GASTROENTERITIS AND COLITIS, UNSPECIFIED: ICD-10-CM

## 2019-06-25 PROCEDURE — 36415 COLL VENOUS BLD VENIPUNCTURE: CPT

## 2019-06-25 PROCEDURE — 99213 OFFICE O/P EST LOW 20 MIN: CPT | Mod: GC,25

## 2019-06-25 PROCEDURE — 99215 OFFICE O/P EST HI 40 MIN: CPT | Mod: 25

## 2019-06-25 RX ORDER — PREDNISONE 10 MG/1
10 TABLET ORAL
Refills: 0 | Status: COMPLETED | OUTPATIENT
Start: 2019-06-25

## 2019-06-25 RX ADMIN — PREDNISONE 8 MG: 10 TABLET ORAL at 00:00

## 2019-06-26 ENCOUNTER — MED ADMIN CHARGE (OUTPATIENT)
Age: 49
End: 2019-06-26

## 2019-06-26 ENCOUNTER — MOBILE ON CALL (OUTPATIENT)
Age: 49
End: 2019-06-26

## 2019-06-26 ENCOUNTER — APPOINTMENT (OUTPATIENT)
Dept: NEUROLOGY | Facility: CLINIC | Age: 49
End: 2019-06-26
Payer: MEDICARE

## 2019-06-26 VITALS
WEIGHT: 173 LBS | OXYGEN SATURATION: 97 % | DIASTOLIC BLOOD PRESSURE: 100 MMHG | HEIGHT: 75 IN | BODY MASS INDEX: 21.51 KG/M2 | SYSTOLIC BLOOD PRESSURE: 141 MMHG | HEART RATE: 84 BPM

## 2019-06-26 DIAGNOSIS — R51 HEADACHE: ICD-10-CM

## 2019-06-26 DIAGNOSIS — G43.909 MIGRAINE, UNSPECIFIED, NOT INTRACTABLE, WITHOUT STATUS MIGRAINOSUS: ICD-10-CM

## 2019-06-26 PROBLEM — K52.9 COLITIS: Status: ACTIVE | Noted: 2019-06-25

## 2019-06-26 LAB — CRP SERPL-MCNC: <0.1 MG/DL

## 2019-06-26 PROCEDURE — 99215 OFFICE O/P EST HI 40 MIN: CPT

## 2019-06-26 RX ORDER — SIMETHICONE 80 MG/1
80 TABLET, CHEWABLE ORAL
Qty: 120 | Refills: 2 | Status: DISCONTINUED | COMMUNITY
Start: 2019-03-20 | End: 2019-06-26

## 2019-06-26 NOTE — PHYSICAL EXAM
[General Appearance - Alert] : alert [General Appearance - Well Nourished] : well nourished [Outer Ear] : the ears and nose were normal in appearance [Sclera] : the sclera and conjunctiva were normal [Oropharynx] : the oropharynx was normal [Neck Appearance] : the appearance of the neck was normal [Neck Cervical Mass (___cm)] : no neck mass was observed [Respiration, Rhythm And Depth] : normal respiratory rhythm and effort [Apical Impulse] : the apical impulse was normal [Heart Rate And Rhythm] : heart rate was normal and rhythm regular [Edema] : there was no peripheral edema [Abdomen Soft] : soft [Bowel Sounds] : normal bowel sounds [Abdomen Tenderness] : non-tender [Abdomen Mass (___ Cm)] : no abdominal mass palpated [No CVA Tenderness] : no ~M costovertebral angle tenderness [Abnormal Walk] : normal gait [Skin Color & Pigmentation] : normal skin color and pigmentation [] : no rash [No Focal Deficits] : no focal deficits [Oriented To Time, Place, And Person] : oriented to person, place, and time

## 2019-06-26 NOTE — HISTORY OF PRESENT ILLNESS
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 \par \par 4/23/19\par - feels very good except for gas- continuous, not worse but just continues. a lot of burping. burrning\par - not drihnking soda or other carbonated beverages, just water. cut down dairy and acid containing fluids\par - still didn’t get outside meds\par - currently taking NSAIDs 2x/day and also started meloxicam. 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 He is also still having rectal bleeding where he notices a small amount of blood on the toilet paper. He also has abdominal pain with burning from the stomach up until the esophagus. He is also still struggling with constipation. Has been taking Linzess 72mg daily which has helped him have more frequent bowel movements (3x/week) compared to prior where he was barely having BMs weekly. \par Reports recently had HRM done (waiting for report to be read) at [facility]. \par \par Previous hx:\par Endoscopy/Colonoscopy done in December 2017 at [facility] (requested results)\par Lactulose hydrogen breath test: negative for small intestinal bacterial overgrowth \par ARM: Pelvic floor dyssynergia, Type I \par

## 2019-06-26 NOTE — ASSESSMENT
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) in 9/2018 presents for follow up.\par \par Dyspepsia \par -continue Omeprazole 40 mg daily\par -try activated charcoal pills to dissipate gas bubbles and assist patient to belch \par -attempt to retrieve colonoscopy and endoscopy report one more time from [facility], if cannot get will consider performing EGD due to extensive length of symptoms \par -avoid NSAIDs/ Excedrin use \par \par Colitis (seen on CT scan)\par -patient asymptomatic, however check CRP and fecal calprotectin today in order to evaluate for lingering inflammation \par \par Chronic constipation well controlled on Linzess \par -continue Linzess to 145mcg daily \par \par SMV on Xarelto\par F/U Dr. Guerrero regarding anticoagulation therapy and if any epigastric symptoms due to clot extension or if stable\par \par Migraines\par -explained to patient cannot treat condition and recommended he see PCP, made appt for patient on the 2nd floor \par \par F/U in 3 months\par \par Bertha Macario NP

## 2019-06-27 ENCOUNTER — TRANSCRIPTION ENCOUNTER (OUTPATIENT)
Age: 49
End: 2019-06-27

## 2019-06-27 RX ORDER — DIVALPROEX SODIUM 500 MG/1
500 TABLET, DELAYED RELEASE ORAL
Qty: 60 | Refills: 2 | Status: DISCONTINUED | COMMUNITY
Start: 2019-05-01 | End: 2019-06-27

## 2019-07-01 ENCOUNTER — APPOINTMENT (OUTPATIENT)
Dept: NEUROLOGY | Facility: CLINIC | Age: 49
End: 2019-07-01

## 2019-07-05 NOTE — ED ADULT NURSE NOTE - CAS TRG GEN SKIN COLOR
Pt calls stating the last time she saw Dr. Benitez she suggested pt use a walker.  She requests that at this time.     Order entered.   
Normal for race
Mother  Still living? Unknown  Family history of diabetes mellitus, Age at diagnosis: Age Unknown  Family history of hypertension, Age at diagnosis: Age Unknown

## 2019-07-08 ENCOUNTER — RX RENEWAL (OUTPATIENT)
Age: 49
End: 2019-07-08

## 2019-07-08 ENCOUNTER — TRANSCRIPTION ENCOUNTER (OUTPATIENT)
Age: 49
End: 2019-07-08

## 2019-07-08 RX ORDER — ASCORBIC ACID 1000 MG
200 TABLET ORAL DAILY
Qty: 30 | Refills: 0 | Status: DISCONTINUED | COMMUNITY
Start: 2019-06-26 | End: 2019-07-08

## 2019-07-08 RX ORDER — SIMETHICONE 80 MG/1
80 TABLET, CHEWABLE ORAL
Qty: 120 | Refills: 2 | Status: ACTIVE | COMMUNITY
Start: 2019-07-08 | End: 1900-01-01

## 2019-07-09 ENCOUNTER — TRANSCRIPTION ENCOUNTER (OUTPATIENT)
Age: 49
End: 2019-07-09

## 2019-07-09 RX ORDER — SUMATRIPTAN SUCCINATE 6 MG/.5ML
6 INJECTION SUBCUTANEOUS
Qty: 5 | Refills: 0 | Status: DISCONTINUED | COMMUNITY
Start: 2019-06-26 | End: 2019-07-09

## 2019-07-11 ENCOUNTER — APPOINTMENT (OUTPATIENT)
Dept: NEUROLOGY | Facility: CLINIC | Age: 49
End: 2019-07-11
Payer: MEDICARE

## 2019-07-11 ENCOUNTER — APPOINTMENT (OUTPATIENT)
Dept: VASCULAR SURGERY | Facility: CLINIC | Age: 49
End: 2019-07-11
Payer: MEDICARE

## 2019-07-11 VITALS — SYSTOLIC BLOOD PRESSURE: 157 MMHG | DIASTOLIC BLOOD PRESSURE: 93 MMHG

## 2019-07-11 VITALS
HEIGHT: 75 IN | BODY MASS INDEX: 21.76 KG/M2 | OXYGEN SATURATION: 98 % | WEIGHT: 175 LBS | TEMPERATURE: 97.8 F | HEART RATE: 69 BPM

## 2019-07-11 LAB — CALPROTECTIN FECAL: 107 UG/G

## 2019-07-11 PROCEDURE — 99214 OFFICE O/P EST MOD 30 MIN: CPT

## 2019-07-11 PROCEDURE — 99212 OFFICE O/P EST SF 10 MIN: CPT

## 2019-07-12 RX ORDER — RIVAROXABAN 20 MG/1
20 TABLET, FILM COATED ORAL
Qty: 30 | Refills: 0 | Status: ACTIVE | COMMUNITY
Start: 1900-01-01 | End: 1900-01-01
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
## 2019-07-12 NOTE — ASSESSMENT
[FreeTextEntry1] : 49 year old male with  diagnosis of superior mesenteric vein thrombosis. He is taking Xarelto for the past 10 months, abdominal symptoms resolved after treatment of colitis with GI. CT scan was done end of may and showed resolution of SMV. Will take Xarelto for a full 12 months (2 more months) and will discontinue. FU PRN. Will call us with any issues.

## 2019-07-12 NOTE — PHYSICAL EXAM
[Ankle Swelling (On Exam)] : not present [2+] : left 2+ [] : not present [Varicose Veins Of Lower Extremities] : not present [Alert] : alert [Oriented to Place] : oriented to place [Oriented to Person] : oriented to person [Oriented to Time] : oriented to time [Calm] : calm [de-identified] : well appearing [de-identified] : NT/ND

## 2019-07-17 ENCOUNTER — TRANSCRIPTION ENCOUNTER (OUTPATIENT)
Age: 49
End: 2019-07-17

## 2019-07-24 ENCOUNTER — MED ADMIN CHARGE (OUTPATIENT)
Age: 49
End: 2019-07-24

## 2019-07-24 ENCOUNTER — TRANSCRIPTION ENCOUNTER (OUTPATIENT)
Age: 49
End: 2019-07-24

## 2019-07-25 ENCOUNTER — MED ADMIN CHARGE (OUTPATIENT)
Age: 49
End: 2019-07-25

## 2019-07-25 ENCOUNTER — TRANSCRIPTION ENCOUNTER (OUTPATIENT)
Age: 49
End: 2019-07-25

## 2019-07-25 RX ORDER — SIMETHICONE 250 MG/1
250 CAPSULE, GELATIN COATED ORAL TWICE DAILY
Qty: 60 | Refills: 0 | Status: ACTIVE | COMMUNITY
Start: 2019-07-25 | End: 1900-01-01

## 2019-08-12 ENCOUNTER — TRANSCRIPTION ENCOUNTER (OUTPATIENT)
Age: 49
End: 2019-08-12

## 2019-08-13 ENCOUNTER — TRANSCRIPTION ENCOUNTER (OUTPATIENT)
Age: 49
End: 2019-08-13

## 2019-08-15 ENCOUNTER — APPOINTMENT (OUTPATIENT)
Dept: GASTROENTEROLOGY | Facility: CLINIC | Age: 49
End: 2019-08-15
Payer: MEDICARE

## 2019-08-15 VITALS
BODY MASS INDEX: 21.39 KG/M2 | SYSTOLIC BLOOD PRESSURE: 128 MMHG | HEART RATE: 65 BPM | HEIGHT: 75 IN | WEIGHT: 172 LBS | DIASTOLIC BLOOD PRESSURE: 72 MMHG | RESPIRATION RATE: 14 BRPM | OXYGEN SATURATION: 98 %

## 2019-08-15 PROCEDURE — 99214 OFFICE O/P EST MOD 30 MIN: CPT

## 2019-08-15 NOTE — ASSESSMENT
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 Guerrero. \par -continue to avoid NSAIDs/ Excedrin use \par  -continue Omeprazole 40 mg daily\par -Smartpill to evaluate presence of dysmotility \par \par Colitis (seen on CT scan)\par -patient asymptomatic, CRP and fecal calprotectin negative \par \par Chronic constipation well controlled on Linzess \par -continue Linzess to 145mcg daily \par \par SMV on Xarelto\par -continue f/u with Dr. Guerrero\par \par F/U 2 weeks post EGD \par \par Bertha Macario, NP

## 2019-08-15 NOTE — PHYSICAL EXAM
[General Appearance - Alert] : alert [Sclera] : the sclera and conjunctiva were normal [General Appearance - Well Nourished] : well nourished [Outer Ear] : the ears and nose were normal in appearance [Oropharynx] : the oropharynx was normal [Neck Appearance] : the appearance of the neck was normal [Neck Cervical Mass (___cm)] : no neck mass was observed [Respiration, Rhythm And Depth] : normal respiratory rhythm and effort [Apical Impulse] : the apical impulse was normal [Heart Rate And Rhythm] : heart rate was normal and rhythm regular [Edema] : there was no peripheral edema [Bowel Sounds] : normal bowel sounds [Abdomen Soft] : soft [Abdomen Tenderness] : non-tender [Abdomen Mass (___ Cm)] : no abdominal mass palpated [Abnormal Walk] : normal gait [No CVA Tenderness] : no ~M costovertebral angle tenderness [Skin Color & Pigmentation] : normal skin color and pigmentation [] : no rash [No Focal Deficits] : no focal deficits [Oriented To Time, Place, And Person] : oriented to person, place, and time

## 2019-08-15 NOTE — HISTORY OF PRESENT ILLNESS
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) in 9/2018 presents for follow up.\par \par 8/15/19\par - patient reports today that he is doing well in regards to his health except for continued gas pain/trapping, he has tried simethicone and charcoal pills with no relief \par - constantly feels gas buildup in the epigastric region, his wife will have to push on his back in order to release gas from above or below \par - also complains today of water and pills getting stuck in the middle of his chest after swallowing \par - taking Linzess and BM daily \par -migraines are under control now- working with neurologist and no longer taking excedrin\par \par 6/25/19\par Reports was in ER in the middle of May,  CT scan-found to have Colitis (given antibiotic treatment) \par After ED visit has had normal BMs, high energy, and weight gain\par Only GI complaint today is that he is still having trouble with belching (gas feels trapped in middle of stomach) simethicone did not help at all\par was taking one bottle Excedrin per week- now taking one to two pills per week \par have not eaten in days- vomited 3 days ago from migraine, reports seeing neurologist tomorrow but needs help with migraine today. \par \par 4/23/19\par - feels very good except for gas- continuous, not worse but just continues. a lot of burping. burrning\par - not drihnking soda or other carbonated beverages, just water. cut down dairy and acid containing fluids\par - still didn’t get outside meds\par - currently taking NSAIDs 2x/day and also started meloxicam. 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 He is also still having rectal bleeding where he notices a small amount of blood on the toilet paper. He also has abdominal pain with burning from the stomach up until the esophagus. He is also still struggling with constipation. Has been taking Linzess 72mg daily which has helped him have more frequent bowel movements (3x/week) compared to prior where he was barely having BMs weekly. \par Reports recently had HRM done (waiting for report to be read) at [facility]. \par \par Previous hx:\par Endoscopy/Colonoscopy done in December 2017 at [facility] (requested results)\par Lactulose hydrogen breath test: negative for small intestinal bacterial overgrowth \par ARM: Pelvic floor dyssynergia, Type I \par

## 2019-08-20 ENCOUNTER — TRANSCRIPTION ENCOUNTER (OUTPATIENT)
Age: 49
End: 2019-08-20

## 2019-08-21 ENCOUNTER — APPOINTMENT (OUTPATIENT)
Dept: NEUROLOGY | Facility: CLINIC | Age: 49
End: 2019-08-21
Payer: MEDICARE

## 2019-08-21 VITALS
BODY MASS INDEX: 21.39 KG/M2 | WEIGHT: 172 LBS | HEART RATE: 88 BPM | DIASTOLIC BLOOD PRESSURE: 81 MMHG | SYSTOLIC BLOOD PRESSURE: 126 MMHG | HEIGHT: 75 IN | OXYGEN SATURATION: 97 % | TEMPERATURE: 98.1 F

## 2019-08-21 PROCEDURE — 99215 OFFICE O/P EST HI 40 MIN: CPT

## 2019-08-21 RX ORDER — SUMATRIPTAN SUCCINATE 6 MG/.5ML
6 INJECTION SUBCUTANEOUS
Qty: 18 | Refills: 0 | Status: DISCONTINUED | COMMUNITY
Start: 2019-07-03 | End: 2019-08-21

## 2019-08-22 RX ORDER — PREDNISONE 5 MG/1
5 TABLET ORAL
Qty: 150 | Refills: 0 | Status: DISCONTINUED | COMMUNITY
Start: 2019-06-26 | End: 2019-08-22

## 2019-08-23 ENCOUNTER — APPOINTMENT (OUTPATIENT)
Dept: NEUROLOGY | Facility: CLINIC | Age: 49
End: 2019-08-23
Payer: MEDICARE

## 2019-08-23 VITALS
TEMPERATURE: 98.5 F | HEIGHT: 75 IN | SYSTOLIC BLOOD PRESSURE: 119 MMHG | OXYGEN SATURATION: 97 % | BODY MASS INDEX: 21.39 KG/M2 | WEIGHT: 172 LBS | DIASTOLIC BLOOD PRESSURE: 70 MMHG | HEART RATE: 82 BPM

## 2019-08-23 PROCEDURE — 64615 CHEMODENERV MUSC MIGRAINE: CPT

## 2019-08-23 NOTE — PROCEDURE
[FreeTextEntry1] : botox for headache [FreeTextEntry2] : chronic daily headache [FreeTextEntry4] : ethyl chloride [FreeTextEntry3] : Patient was consented with explanation of risks and benefits including black box warnings and explanation of alternative treatments. \par having daily headaches - migraine and cluster.\par Patient was injected in the sitting position with ethyl chloride spray used before each injection. \par Muscles injected:\par Procerus - 5 units\par  - 0 units in each side = 0 units\par Frontalis - 5 units in 2 injections on each side = 20 units\par Temporalis - 5 units in 4 injection in each side = 40 units\par Occipitalis - 5 units in 3 injections on each side = 30 units\par Splenius capitis - 5 units in 2 injections on each side = 20\par Trapezius - 5 units in 3 injections on each side = 30\par \par Total used 145 units Onabotulinum toxin\par Total wasted = 55\par Total billable = 200 units\par \par Patient tolerated procedure well with less pain. I have asked the patient to continue keeping track of headaches.\par

## 2019-08-27 ENCOUNTER — TRANSCRIPTION ENCOUNTER (OUTPATIENT)
Age: 49
End: 2019-08-27

## 2019-08-27 ENCOUNTER — MEDICATION RENEWAL (OUTPATIENT)
Age: 49
End: 2019-08-27

## 2019-08-29 ENCOUNTER — APPOINTMENT (OUTPATIENT)
Dept: INTERNAL MEDICINE | Facility: CLINIC | Age: 49
End: 2019-08-29

## 2019-09-13 ENCOUNTER — MEDICATION RENEWAL (OUTPATIENT)
Age: 49
End: 2019-09-13

## 2019-09-23 ENCOUNTER — OUTPATIENT (OUTPATIENT)
Dept: OUTPATIENT SERVICES | Facility: HOSPITAL | Age: 49
LOS: 1 days | Discharge: HOME | End: 2019-09-23
Payer: MEDICARE

## 2019-09-23 ENCOUNTER — APPOINTMENT (OUTPATIENT)
Dept: GASTROENTEROLOGY | Facility: HOSPITAL | Age: 49
End: 2019-09-23

## 2019-09-23 ENCOUNTER — RESULT REVIEW (OUTPATIENT)
Age: 49
End: 2019-09-23

## 2019-09-23 PROCEDURE — 43239 EGD BIOPSY SINGLE/MULTIPLE: CPT

## 2019-09-23 PROCEDURE — 88305 TISSUE EXAM BY PATHOLOGIST: CPT

## 2019-09-24 LAB — SURGICAL PATHOLOGY STUDY: SIGNIFICANT CHANGE UP

## 2019-09-25 ENCOUNTER — TRANSCRIPTION ENCOUNTER (OUTPATIENT)
Age: 49
End: 2019-09-25

## 2019-10-02 ENCOUNTER — APPOINTMENT (OUTPATIENT)
Dept: NEUROLOGY | Facility: CLINIC | Age: 49
End: 2019-10-02
Payer: MEDICARE

## 2019-10-02 VITALS
TEMPERATURE: 97.9 F | OXYGEN SATURATION: 97 % | HEIGHT: 75 IN | WEIGHT: 172 LBS | SYSTOLIC BLOOD PRESSURE: 124 MMHG | HEART RATE: 76 BPM | DIASTOLIC BLOOD PRESSURE: 90 MMHG | BODY MASS INDEX: 21.39 KG/M2

## 2019-10-02 PROCEDURE — 99214 OFFICE O/P EST MOD 30 MIN: CPT

## 2019-10-02 RX ORDER — GALCANEZUMAB 100 MG/ML
100 INJECTION, SOLUTION SUBCUTANEOUS
Qty: 3 | Refills: 0 | Status: DISCONTINUED | COMMUNITY
Start: 2019-06-27 | End: 2019-10-02

## 2019-10-02 RX ORDER — SUMATRIPTAN 20 MG/1
20 SPRAY NASAL
Qty: 1 | Refills: 0 | Status: ACTIVE | COMMUNITY
Start: 2019-08-21 | End: 1900-01-01

## 2019-10-02 RX ORDER — MAGNESIUM OXIDE 400 MG
400 CAPSULE ORAL
Qty: 30 | Refills: 0 | Status: ACTIVE | COMMUNITY
Start: 2019-10-02 | End: 1900-01-01

## 2019-10-14 ENCOUNTER — TRANSCRIPTION ENCOUNTER (OUTPATIENT)
Age: 49
End: 2019-10-14

## 2019-10-14 ENCOUNTER — RX RENEWAL (OUTPATIENT)
Age: 49
End: 2019-10-14

## 2019-10-18 ENCOUNTER — APPOINTMENT (OUTPATIENT)
Dept: INTERNAL MEDICINE | Facility: CLINIC | Age: 49
End: 2019-10-18

## 2019-10-25 ENCOUNTER — APPOINTMENT (OUTPATIENT)
Dept: GASTROENTEROLOGY | Facility: CLINIC | Age: 49
End: 2019-10-25
Payer: MEDICARE

## 2019-10-25 PROCEDURE — 43237 ENDOSCOPIC US EXAM ESOPH: CPT

## 2019-10-28 ENCOUNTER — TRANSCRIPTION ENCOUNTER (OUTPATIENT)
Age: 49
End: 2019-10-28

## 2019-10-31 ENCOUNTER — TRANSCRIPTION ENCOUNTER (OUTPATIENT)
Age: 49
End: 2019-10-31

## 2019-11-01 ENCOUNTER — LABORATORY RESULT (OUTPATIENT)
Age: 49
End: 2019-11-01

## 2019-11-01 ENCOUNTER — APPOINTMENT (OUTPATIENT)
Dept: GASTROENTEROLOGY | Facility: CLINIC | Age: 49
End: 2019-11-01
Payer: MEDICARE

## 2019-11-01 VITALS
RESPIRATION RATE: 15 BRPM | SYSTOLIC BLOOD PRESSURE: 139 MMHG | BODY MASS INDEX: 21.26 KG/M2 | OXYGEN SATURATION: 96 % | HEIGHT: 75 IN | DIASTOLIC BLOOD PRESSURE: 85 MMHG | HEART RATE: 75 BPM | TEMPERATURE: 98 F | WEIGHT: 171 LBS

## 2019-11-01 PROCEDURE — 99214 OFFICE O/P EST MOD 30 MIN: CPT | Mod: 25

## 2019-11-01 PROCEDURE — 36415 COLL VENOUS BLD VENIPUNCTURE: CPT

## 2019-11-01 RX ORDER — LACTOBACIL 2/BIFIDO 1/S.THERMO 900B CELL
PACKET (EA) ORAL
Qty: 30 | Refills: 2 | Status: ACTIVE | COMMUNITY
Start: 2019-11-01 | End: 1900-01-01

## 2019-11-01 NOTE — PHYSICAL EXAM
[General Appearance - Alert] : alert [General Appearance - Well Nourished] : well nourished [Sclera] : the sclera and conjunctiva were normal [Outer Ear] : the ears and nose were normal in appearance [Oropharynx] : the oropharynx was normal [Neck Appearance] : the appearance of the neck was normal [Neck Cervical Mass (___cm)] : no neck mass was observed [Respiration, Rhythm And Depth] : normal respiratory rhythm and effort [Apical Impulse] : the apical impulse was normal [Heart Rate And Rhythm] : heart rate was normal and rhythm regular [Edema] : there was no peripheral edema [Bowel Sounds] : normal bowel sounds [Abdomen Soft] : soft [Abdomen Tenderness] : non-tender [Abdomen Mass (___ Cm)] : no abdominal mass palpated [No CVA Tenderness] : no ~M costovertebral angle tenderness [Abnormal Walk] : normal gait [Skin Color & Pigmentation] : normal skin color and pigmentation [] : no rash [No Focal Deficits] : no focal deficits [Oriented To Time, Place, And Person] : oriented to person, place, and time

## 2019-11-04 ENCOUNTER — TRANSCRIPTION ENCOUNTER (OUTPATIENT)
Age: 49
End: 2019-11-04

## 2019-11-04 LAB
BARLEY IGE QN: <0.1 KUA/L
CHERRY IGE QN: <0.1 KUA/L
COW MILK IGE QN: <0.1 KUA/L
CRAB IGE QN: <0.1 KUA/L
DEPRECATED BARLEY IGE RAST QL: 0
DEPRECATED CHERRY IGE RAST QL: 0
DEPRECATED COW MILK IGE RAST QL: 0
DEPRECATED CRAB IGE RAST QL: 0
DEPRECATED EGG WHITE IGE RAST QL: 0
DEPRECATED OAT IGE RAST QL: 0
DEPRECATED PEANUT IGE RAST QL: 0
DEPRECATED RYE IGE RAST QL: 0
DEPRECATED SOYBEAN IGE RAST QL: 0
DEPRECATED WHEAT IGE RAST QL: 0
EGG WHITE IGE QN: <0.1 KUA/L
IGA SER QL IEP: 173 MG/DL
OAT IGE QN: <0.1 KUA/L
PEANUT IGE QN: <0.1 KUA/L
RYE IGE QN: <0.1 KUA/L
SOYBEAN IGE QN: <0.1 KUA/L
TOTAL IGE SMQN RAST: 12 KU/L
WHEAT IGE QN: <0.1 KUA/L

## 2019-11-04 NOTE — ASSESSMENT
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 Guerrero\par \par F/U post Smartpill procedure \par \par Bertha Macario, DONNA

## 2019-11-04 NOTE — HISTORY OF PRESENT ILLNESS
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) in 9/2018 presents for follow up.\par \par 11/1/18\par - patient reports that he is still having chest discomfort\par - he still has a difficult time burping and feels that bubbles are trapped in the middle of his stomach, abdominal bloating  \par - diarrhea, watery, constipation - felt better after antibiotics that he was treated with for colitis in the past\par - weight fluctuations \par - migraines continue to be much better- has been on prednisone for 1 month \par - EGD(9/23/19): normal except 10mm submucosal lesion in gastric antrum found, EUS(10/25/19): normal, no follow up required \par \par 8/15/19\par - patient reports today that he is doing well in regards to his health except for continued gas pain/trapping, he has tried simethicone and charcoal pills with no relief \par - constantly feels gas buildup in the epigastric region, his wife will have to push on his back in order to release gas from above or below \par - also complains today of water and pills getting stuck in the middle of his chest after swallowing \par - taking Linzess and BM daily \par -migraines are under control now- working with neurologist and no longer taking excedrin\par \par 6/25/19\par Reports was in ER in the middle of May,  CT scan-found to have Colitis (given antibiotic treatment) \par After ED visit has had normal BMs, high energy, and weight gain\par Only GI complaint today is that he is still having trouble with belching (gas feels trapped in middle of stomach) simethicone did not help at all\par was taking one bottle Excedrin per week- now taking one to two pills per week \par have not eaten in days- vomited 3 days ago from migraine, reports seeing neurologist tomorrow but needs help with migraine today. \par \par 4/23/19\par - feels very good except for gas- continuous, not worse but just continues. a lot of burping. burrning\par - not drihnking soda or other carbonated beverages, just water. cut down dairy and acid containing fluids\par - still didn’t get outside meds\par - currently taking NSAIDs 2x/day and also started meloxicam. 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 He is also still having rectal bleeding where he notices a small amount of blood on the toilet paper. He also has abdominal pain with burning from the stomach up until the esophagus. He is also still struggling with constipation. Has been taking Linzess 72mg daily which has helped him have more frequent bowel movements (3x/week) compared to prior where he was barely having BMs weekly. \par Reports recently had HRM done (waiting for report to be read) at [facility]. \par \par Previous hx:\par Endoscopy/Colonoscopy done in December 2017 at [facility] (requested results)\par Lactulose hydrogen breath test: negative for small intestinal bacterial overgrowth \par ARM: Pelvic floor dyssynergia, Type I \par

## 2019-11-05 ENCOUNTER — TRANSCRIPTION ENCOUNTER (OUTPATIENT)
Age: 49
End: 2019-11-05

## 2019-11-05 LAB
GLIADIN IGA SER QL: 19 UNITS
GLIADIN IGG SER QL: <5 UNITS
GLIADIN PEPTIDE IGA SER-ACNC: NEGATIVE
GLIADIN PEPTIDE IGG SER-ACNC: NEGATIVE
TTG IGA SER IA-ACNC: <1.2 U/ML
TTG IGA SER-ACNC: NEGATIVE
TTG IGG SER IA-ACNC: 1.3 U/ML
TTG IGG SER IA-ACNC: NEGATIVE

## 2019-11-06 ENCOUNTER — TRANSCRIPTION ENCOUNTER (OUTPATIENT)
Age: 49
End: 2019-11-06

## 2019-11-06 ENCOUNTER — RX RENEWAL (OUTPATIENT)
Age: 49
End: 2019-11-06

## 2019-11-08 ENCOUNTER — TRANSCRIPTION ENCOUNTER (OUTPATIENT)
Age: 49
End: 2019-11-08

## 2019-11-12 ENCOUNTER — TRANSCRIPTION ENCOUNTER (OUTPATIENT)
Age: 49
End: 2019-11-12

## 2019-11-13 ENCOUNTER — APPOINTMENT (OUTPATIENT)
Dept: GASTROENTEROLOGY | Facility: CLINIC | Age: 49
End: 2019-11-13
Payer: MEDICARE

## 2019-11-13 VITALS
RESPIRATION RATE: 16 BRPM | WEIGHT: 171 LBS | BODY MASS INDEX: 21.26 KG/M2 | SYSTOLIC BLOOD PRESSURE: 116 MMHG | HEART RATE: 104 BPM | DIASTOLIC BLOOD PRESSURE: 70 MMHG | HEIGHT: 75 IN | OXYGEN SATURATION: 97 %

## 2019-11-14 ENCOUNTER — TRANSCRIPTION ENCOUNTER (OUTPATIENT)
Age: 49
End: 2019-11-14

## 2019-11-15 ENCOUNTER — TRANSCRIPTION ENCOUNTER (OUTPATIENT)
Age: 49
End: 2019-11-15

## 2019-11-20 ENCOUNTER — APPOINTMENT (OUTPATIENT)
Dept: GASTROENTEROLOGY | Facility: CLINIC | Age: 49
End: 2019-11-20
Payer: MEDICARE

## 2019-11-20 VITALS
WEIGHT: 171 LBS | HEIGHT: 75 IN | DIASTOLIC BLOOD PRESSURE: 85 MMHG | SYSTOLIC BLOOD PRESSURE: 126 MMHG | OXYGEN SATURATION: 98 % | RESPIRATION RATE: 15 BRPM | TEMPERATURE: 98.1 F | BODY MASS INDEX: 21.26 KG/M2 | HEART RATE: 88 BPM

## 2019-11-20 PROCEDURE — 91112 GI WIRELESS CAPSULE MEASURE: CPT

## 2019-11-20 NOTE — HISTORY OF PRESENT ILLNESS
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 \par \par Patient reports today that he is feeling better in regards to his symptoms of gas trapping and chest discomfort. He has been taking Baclofen since November 1st and thinks it is helping somewhat. However, symptoms are not gone and still bothersome. He has prepared for procedure by not taking any antacids in 7 days. He takes Linzess but held off for 48 hours as recommended. Does not drink alcohol or smoke cigarettes. \par \par Previous history:\par 11/1/18\par - patient reports that he is still having chest discomfort\par - he still has a difficult time burping and feels that bubbles are trapped in the middle of his stomach, abdominal bloating  \par - diarrhea, watery, constipation - felt better after antibiotics that he was treated with for colitis in the past\par - weight fluctuations \par - migraines continue to be much better- has been on prednisone for 1 month \par - EGD(9/23/19): normal except 10mm submucosal lesion in gastric antrum found, EUS(10/25/19): normal, no follow up required \par \par 8/15/19\par - patient reports today that he is doing well in regards to his health except for continued gas pain/trapping, he has tried simethicone and charcoal pills with no relief \par - constantly feels gas buildup in the epigastric region, his wife will have to push on his back in order to release gas from above or below \par - also complains today of water and pills getting stuck in the middle of his chest after swallowing \par - taking Linzess and BM daily \par -migraines are under control now- working with neurologist and no longer taking excedrin\par \par 6/25/19\par Reports was in ER in the middle of May,  CT scan-found to have Colitis (given antibiotic treatment) \par After ED visit has had normal BMs, high energy, and weight gain\par Only GI complaint today is that he is still having trouble with belching (gas feels trapped in middle of stomach) simethicone did not help at all\par was taking one bottle Excedrin per week- now taking one to two pills per week \par have not eaten in days- vomited 3 days ago from migraine, reports seeing neurologist tomorrow but needs help with migraine today. \par \par 4/23/19\par - feels very good except for gas- continuous, not worse but just continues. a lot of burping. burrning\par - not drihnking soda or other carbonated beverages, just water. cut down dairy and acid containing fluids\par - still didn’t get outside meds\par - currently taking NSAIDs 2x/day and also started meloxicam. 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 He is also still having rectal bleeding where he notices a small amount of blood on the toilet paper. He also has abdominal pain with burning from the stomach up until the esophagus. He is also still struggling with constipation. Has been taking Linzess 72mg daily which has helped him have more frequent bowel movements (3x/week) compared to prior where he was barely having BMs weekly. \par Reports recently had HRM done (waiting for report to be read) at [facility]. \par \par Previous hx:\par Endoscopy/Colonoscopy done in December 2017 at [facility] (requested results)\par Lactulose hydrogen breath test: negative for small intestinal bacterial overgrowth \par ARM: Pelvic floor dyssynergia, Type I \par

## 2019-11-20 NOTE — ASSESSMENT
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 \par \par SmartPill Administration:\par Patient presented to office having fasted for 8 hours.  I assessed for contraindications to SmartPill, none of which are present. We reviewed adverse events of SmartPill administration including capsule retention.  Patient ate the meal bar in the allotted 15 minutes and ingested the capsule without difficulty. He was instructed to fast for an additional 6 hours during which he may sip one cup of water.  We discussed SmartPill recorder and diary use.  Patient verbalizes understanding. \par \par Disposition: Patient will return the SmartPill recorder and diary on Tuesday, November 26th, 2019. He is instructed to contact the office if any questions arise.

## 2019-11-20 NOTE — PHYSICAL EXAM
[General Appearance - Alert] : alert [General Appearance - Well Nourished] : well nourished [General Appearance - In No Acute Distress] : in no acute distress [Respiration, Rhythm And Depth] : normal respiratory rhythm and effort [] : no respiratory distress [Heart Rate And Rhythm] : heart rate was normal and rhythm regular [Abdomen Soft] : soft [Abdomen Tenderness] : non-tender [Bowel Sounds] : normal bowel sounds [Oriented To Time, Place, And Person] : oriented to person, place, and time [Impaired Insight] : insight and judgment were intact [Affect] : the affect was normal

## 2019-11-22 ENCOUNTER — TRANSCRIPTION ENCOUNTER (OUTPATIENT)
Age: 49
End: 2019-11-22

## 2019-11-26 ENCOUNTER — APPOINTMENT (OUTPATIENT)
Dept: GASTROENTEROLOGY | Facility: CLINIC | Age: 49
End: 2019-11-26
Payer: MEDICARE

## 2019-11-26 DIAGNOSIS — R10.13 EPIGASTRIC PAIN: ICD-10-CM

## 2019-11-26 PROCEDURE — 91112 GI WIRELESS CAPSULE MEASURE: CPT

## 2019-11-27 ENCOUNTER — TRANSCRIPTION ENCOUNTER (OUTPATIENT)
Age: 49
End: 2019-11-27

## 2019-12-01 ENCOUNTER — FORM ENCOUNTER (OUTPATIENT)
Age: 49
End: 2019-12-01

## 2019-12-02 ENCOUNTER — OUTPATIENT (OUTPATIENT)
Dept: OUTPATIENT SERVICES | Facility: HOSPITAL | Age: 49
LOS: 1 days | End: 2019-12-02
Payer: MEDICARE

## 2019-12-02 ENCOUNTER — APPOINTMENT (OUTPATIENT)
Dept: CT IMAGING | Facility: HOSPITAL | Age: 49
End: 2019-12-02
Payer: MEDICARE

## 2019-12-02 LAB — CALPROTECTIN FECAL: 28 UG/G

## 2019-12-02 PROCEDURE — 74177 CT ABD & PELVIS W/CONTRAST: CPT

## 2019-12-02 PROCEDURE — 74177 CT ABD & PELVIS W/CONTRAST: CPT | Mod: 26

## 2019-12-03 ENCOUNTER — TRANSCRIPTION ENCOUNTER (OUTPATIENT)
Age: 49
End: 2019-12-03

## 2019-12-03 ENCOUNTER — RX RENEWAL (OUTPATIENT)
Age: 49
End: 2019-12-03

## 2019-12-04 ENCOUNTER — APPOINTMENT (OUTPATIENT)
Dept: NEUROLOGY | Facility: CLINIC | Age: 49
End: 2019-12-04
Payer: MEDICARE

## 2019-12-04 ENCOUNTER — TRANSCRIPTION ENCOUNTER (OUTPATIENT)
Age: 49
End: 2019-12-04

## 2019-12-04 ENCOUNTER — NON-APPOINTMENT (OUTPATIENT)
Age: 49
End: 2019-12-04

## 2019-12-04 VITALS
HEIGHT: 75 IN | HEART RATE: 88 BPM | OXYGEN SATURATION: 95 % | BODY MASS INDEX: 21.26 KG/M2 | DIASTOLIC BLOOD PRESSURE: 93 MMHG | TEMPERATURE: 98.1 F | SYSTOLIC BLOOD PRESSURE: 144 MMHG | WEIGHT: 171 LBS

## 2019-12-04 PROCEDURE — 64615 CHEMODENERV MUSC MIGRAINE: CPT

## 2019-12-04 NOTE — PROCEDURE
[FreeTextEntry1] : botox for headache [FreeTextEntry2] : chronic daily headache [FreeTextEntry4] : ethyl chloride [FreeTextEntry3] : Patient was consented with explanation of risks and benefits including black box warnings and explanation of alternative treatments. \par having daily headaches - migraine and cluster. Excellent headache control until month 3. \par Patient was injected in the sitting position with ethyl chloride spray used before each injection. \par Muscles injected:\par Procerus - 5 units\par  - 5 units in each side = 10 units\par Frontalis - 5 units in 2 injections on each side = 20 units\par Temporalis - 5 units in 4 injection in each side = 40 units\par Occipitalis - 5 units in 3 injections on each side = 30 units\par Splenius capitis - 5 units in 2 injections on each side = 20\par Trapezius - 5 units in 3 injections on each side = 30\par \par Total used 155 units Onabotulinum toxin\par Total wasted = 45\par Total billable = 200 units\par \par Patient tolerated procedure well with less pain. I have asked the patient to continue keeping track of headaches.\par

## 2019-12-05 PROBLEM — R10.13 DYSPEPSIA: Status: ACTIVE | Noted: 2019-06-26

## 2019-12-10 ENCOUNTER — TRANSCRIPTION ENCOUNTER (OUTPATIENT)
Age: 49
End: 2019-12-10

## 2019-12-10 ENCOUNTER — MEDICATION RENEWAL (OUTPATIENT)
Age: 49
End: 2019-12-10

## 2019-12-11 ENCOUNTER — TRANSCRIPTION ENCOUNTER (OUTPATIENT)
Age: 49
End: 2019-12-11

## 2019-12-13 ENCOUNTER — APPOINTMENT (OUTPATIENT)
Dept: NEUROLOGY | Facility: CLINIC | Age: 49
End: 2019-12-13

## 2019-12-16 ENCOUNTER — MEDICATION RENEWAL (OUTPATIENT)
Age: 49
End: 2019-12-16

## 2019-12-24 ENCOUNTER — TRANSCRIPTION ENCOUNTER (OUTPATIENT)
Age: 49
End: 2019-12-24

## 2019-12-24 ENCOUNTER — MEDICATION RENEWAL (OUTPATIENT)
Age: 49
End: 2019-12-24

## 2020-01-02 ENCOUNTER — TRANSCRIPTION ENCOUNTER (OUTPATIENT)
Age: 50
End: 2020-01-02

## 2020-01-03 ENCOUNTER — APPOINTMENT (OUTPATIENT)
Dept: GASTROENTEROLOGY | Facility: CLINIC | Age: 50
End: 2020-01-03
Payer: MEDICARE

## 2020-01-03 ENCOUNTER — TRANSCRIPTION ENCOUNTER (OUTPATIENT)
Age: 50
End: 2020-01-03

## 2020-01-03 VITALS
OXYGEN SATURATION: 96 % | TEMPERATURE: 98 F | BODY MASS INDEX: 21.01 KG/M2 | DIASTOLIC BLOOD PRESSURE: 80 MMHG | WEIGHT: 169 LBS | HEIGHT: 75 IN | RESPIRATION RATE: 16 BRPM | HEART RATE: 102 BPM | SYSTOLIC BLOOD PRESSURE: 130 MMHG

## 2020-01-03 DIAGNOSIS — R14.0 ABDOMINAL DISTENSION (GASEOUS): ICD-10-CM

## 2020-01-03 DIAGNOSIS — K92.89 OTHER SPECIFIED DISEASES OF THE DIGESTIVE SYSTEM: ICD-10-CM

## 2020-01-03 PROCEDURE — 99215 OFFICE O/P EST HI 40 MIN: CPT | Mod: 25

## 2020-01-03 PROCEDURE — 36415 COLL VENOUS BLD VENIPUNCTURE: CPT

## 2020-01-03 RX ORDER — OMEPRAZOLE 40 MG/1
40 CAPSULE, DELAYED RELEASE ORAL DAILY
Qty: 30 | Refills: 2 | Status: DISCONTINUED | COMMUNITY
Start: 2019-03-20 | End: 2020-01-03

## 2020-01-03 RX ORDER — LINACLOTIDE 290 UG/1
290 CAPSULE, GELATIN COATED ORAL DAILY
Qty: 30 | Refills: 0 | Status: ACTIVE | COMMUNITY
Start: 2019-03-21 | End: 1900-01-01

## 2020-01-03 NOTE — PHYSICAL EXAM
[General Appearance - Well Nourished] : well nourished [General Appearance - In No Acute Distress] : in no acute distress [General Appearance - Alert] : alert [General Appearance - Well Developed] : well developed [Sclera] : the sclera and conjunctiva were normal [General Appearance - Well-Appearing] : healthy appearing [PERRL With Normal Accommodation] : pupils were equal in size, round, and reactive to light [Outer Ear] : the ears and nose were normal in appearance [Extraocular Movements] : extraocular movements were intact [Hearing Threshold Finger Rub Not [place]] : hearing was normal [Neck Appearance] : the appearance of the neck was normal [Examination Of The Oral Cavity] : the lips and gums were normal [Neck Cervical Mass (___cm)] : no neck mass was observed [Heart Rate And Rhythm] : heart rate was normal and rhythm regular [Respiration, Rhythm And Depth] : normal respiratory rhythm and effort [Edema] : there was no peripheral edema [Exaggerated Use Of Accessory Muscles For Inspiration] : no accessory muscle use [Veins - Varicosity Changes] : there were no varicosital changes [Bowel Sounds] : normal bowel sounds [Abdomen Soft] : soft [Abdomen Tenderness] : non-tender [Nail Clubbing] : no clubbing  or cyanosis of the fingernails [Involuntary Movements] : no involuntary movements were seen [Skin Color & Pigmentation] : normal skin color and pigmentation [Skin Turgor] : normal skin turgor [No Focal Deficits] : no focal deficits [] : no rash [Oriented To Time, Place, And Person] : oriented to person, place, and time [Impaired Insight] : insight and judgment were intact

## 2020-01-04 NOTE — HISTORY OF PRESENT ILLNESS
[de-identified] : Patient is a 50yo M here to follow-up on gas pain/discomfort. Patient states his symptoms have more or less remained the same since his last visit. He feels the Linzess and Baclofen have peaked in terms of their efficiency and he does not feel significant relief. Reviewed results of Smart Pill and CTE. Migraines have improved. Still with feeling of incomplete evacuation with the requiring of assistance bearing down from his wife occasionally for bowel movements.

## 2020-01-04 NOTE — ASSESSMENT
[FreeTextEntry1] : 48yo M presenting for follow-up of abdominal bloating and discomfort.\par \par 1. Small intestinal dysmotility - Confirmed via SmartPill, patient with prolonged small intestinal transit time. \par - f/u DELIA, anti-Centromere Ab, Scleroderma Ab, RNA Polymerase III Ab\par - Increase Linzess to 290 QD, r/b/a/i discussed and patient amenable to increased dosage\par - If no response after 2-4 weeks, will change to Motegrity\par \par 2. Abnormal anorectal manometry\par - Repeat referral for biofeedback sent\par \par RTC as needed

## 2020-01-06 ENCOUNTER — TRANSCRIPTION ENCOUNTER (OUTPATIENT)
Age: 50
End: 2020-01-06

## 2020-01-06 LAB
CENTROMERE IGG SER-ACNC: <0.2 CD:130001892
ENA SCL70 IGG SER IA-ACNC: <0.2 AL

## 2020-01-10 LAB
ANA SER IF-ACNC: NEGATIVE
RNA POLYMERASE III IGG: 12.7 U

## 2020-01-15 ENCOUNTER — TRANSCRIPTION ENCOUNTER (OUTPATIENT)
Age: 50
End: 2020-01-15

## 2020-01-24 ENCOUNTER — TRANSCRIPTION ENCOUNTER (OUTPATIENT)
Age: 50
End: 2020-01-24

## 2020-01-27 ENCOUNTER — TRANSCRIPTION ENCOUNTER (OUTPATIENT)
Age: 50
End: 2020-01-27

## 2020-01-31 ENCOUNTER — TRANSCRIPTION ENCOUNTER (OUTPATIENT)
Age: 50
End: 2020-01-31

## 2020-01-31 DIAGNOSIS — K59.09 OTHER CONSTIPATION: ICD-10-CM

## 2020-01-31 RX ORDER — PRUCALOPRIDE 1 MG/1
1 TABLET, FILM COATED ORAL
Qty: 90 | Refills: 2 | Status: ACTIVE | COMMUNITY
Start: 2020-01-31 | End: 1900-01-01

## 2020-02-14 ENCOUNTER — TRANSCRIPTION ENCOUNTER (OUTPATIENT)
Age: 50
End: 2020-02-14

## 2020-02-18 ENCOUNTER — TRANSCRIPTION ENCOUNTER (OUTPATIENT)
Age: 50
End: 2020-02-18

## 2020-02-19 ENCOUNTER — TRANSCRIPTION ENCOUNTER (OUTPATIENT)
Age: 50
End: 2020-02-19

## 2020-02-21 ENCOUNTER — TRANSCRIPTION ENCOUNTER (OUTPATIENT)
Age: 50
End: 2020-02-21

## 2020-02-28 ENCOUNTER — TRANSCRIPTION ENCOUNTER (OUTPATIENT)
Age: 50
End: 2020-02-28

## 2020-03-02 ENCOUNTER — TRANSCRIPTION ENCOUNTER (OUTPATIENT)
Age: 50
End: 2020-03-02

## 2020-03-03 ENCOUNTER — TRANSCRIPTION ENCOUNTER (OUTPATIENT)
Age: 50
End: 2020-03-03

## 2020-03-05 ENCOUNTER — APPOINTMENT (OUTPATIENT)
Dept: NEUROLOGY | Facility: CLINIC | Age: 50
End: 2020-03-05
Payer: MEDICARE

## 2020-03-05 VITALS
DIASTOLIC BLOOD PRESSURE: 81 MMHG | OXYGEN SATURATION: 98 % | HEIGHT: 75 IN | WEIGHT: 166.03 LBS | BODY MASS INDEX: 20.64 KG/M2 | SYSTOLIC BLOOD PRESSURE: 130 MMHG | HEART RATE: 82 BPM | TEMPERATURE: 98.2 F

## 2020-03-05 PROCEDURE — 64615 CHEMODENERV MUSC MIGRAINE: CPT

## 2020-03-05 NOTE — PROCEDURE
[FreeTextEntry1] : botox for headache [FreeTextEntry2] : chronic daily headache [FreeTextEntry4] : ethyl chloride [FreeTextEntry3] : Patient was consented with explanation of risks and benefits including black box warnings and explanation of alternative treatments. \par having daily headaches - migraine and cluster. Excellent headache control until month 3. \par Patient was injected in the sitting position with ethyl chloride spray used before each injection. \par Muscles injected:\par Procerus - 5 units\par  - 0 units in each side = 0 units\par Frontalis - 5 units in 2 injections on each side = 20 units\par Temporalis - 5 units in 4 injection in each side = 40 units\par Occipitalis - 5 units in 3 injections on each side = 30 units\par Splenius capitis - 5 units in 2 injections on each side = 20\par Trapezius - 5 units in 3 injections on each side = 30\par \par Total used 145 units Onabotulinum toxin\par Total wasted = 55\par Total billable = 200 units\par \par Patient tolerated procedure well with less pain. I have asked the patient to continue keeping track of headaches.\par

## 2020-03-12 ENCOUNTER — TRANSCRIPTION ENCOUNTER (OUTPATIENT)
Age: 50
End: 2020-03-12

## 2020-03-13 ENCOUNTER — APPOINTMENT (OUTPATIENT)
Dept: GASTROENTEROLOGY | Facility: CLINIC | Age: 50
End: 2020-03-13

## 2020-03-18 ENCOUNTER — TRANSCRIPTION ENCOUNTER (OUTPATIENT)
Age: 50
End: 2020-03-18

## 2020-04-02 ENCOUNTER — APPOINTMENT (OUTPATIENT)
Dept: INTERNAL MEDICINE | Facility: CLINIC | Age: 50
End: 2020-04-02

## 2020-04-10 ENCOUNTER — TRANSCRIPTION ENCOUNTER (OUTPATIENT)
Age: 50
End: 2020-04-10

## 2020-04-14 ENCOUNTER — TRANSCRIPTION ENCOUNTER (OUTPATIENT)
Age: 50
End: 2020-04-14

## 2020-04-14 RX ORDER — PREDNISONE 5 MG/1
5 TABLET ORAL
Qty: 150 | Refills: 0 | Status: ACTIVE | COMMUNITY
Start: 2020-04-14 | End: 1900-01-01

## 2020-04-15 ENCOUNTER — TRANSCRIPTION ENCOUNTER (OUTPATIENT)
Age: 50
End: 2020-04-15

## 2020-04-21 ENCOUNTER — APPOINTMENT (OUTPATIENT)
Dept: GASTROENTEROLOGY | Facility: CLINIC | Age: 50
End: 2020-04-21
Payer: MEDICARE

## 2020-04-21 DIAGNOSIS — R19.00 INTRA-ABDOMINAL AND PELVIC SWELLING, MASS AND LUMP, UNSPECIFIED SITE: ICD-10-CM

## 2020-04-21 PROCEDURE — 99214 OFFICE O/P EST MOD 30 MIN: CPT | Mod: 95

## 2020-04-21 NOTE — HISTORY OF PRESENT ILLNESS
[Home] : at home, [unfilled] , at the time of the visit. [Other Location: e.g. Home (Enter Location, City,State)___] : at [unfilled] [Spouse] : spouse [Patient] : the patient [Self] : self [de-identified] : Patient is a 50yo M here to follow-up on gas pain/discomfort. \par - pt requests TELEMEDICINE visit for persistent abdominal bulge\par - pt states that still having issues taking deep breath, gas, abdominal wall bulge despite motegrity which he is taking 1mg in AM 1mg in PM\par \par PREVIOUS HX\par  Patient states his symptoms have more or less remained the same since his last visit. He feels the Linzess and Baclofen have peaked in terms of their efficiency and he does not feel significant relief. Reviewed results of Smart Pill and CTE. Migraines have improved. Still with feeling of incomplete evacuation with the requiring of assistance bearing down from his wife occasionally for bowel movements.

## 2020-04-21 NOTE — ASSESSMENT
[FreeTextEntry1] : 50yo M presenting for follow-up of abdominal bloating and discomfort.\par \par 1. Small intestinal dysmotility - Confirmed via SmartPill, patient with prolonged small intestinal transit time. \par - DELIA, anti-Centromere Ab, Scleroderma Ab, RNA Polymerase III Ab all negative\par - counseled on Motegrity 2mg QHS\par - refer to motility specialist\par \par 2. Abnormal anorectal manometry\par - Repeat referral for biofeedback sent\par \par 3. abdominal wall beverly\par - abdominal US with localization of abdominal bulge in ?rectus muscles\par \par f/u post US

## 2020-05-25 ENCOUNTER — TRANSCRIPTION ENCOUNTER (OUTPATIENT)
Age: 50
End: 2020-05-25

## 2020-05-27 ENCOUNTER — TRANSCRIPTION ENCOUNTER (OUTPATIENT)
Age: 50
End: 2020-05-27

## 2020-06-01 ENCOUNTER — TRANSCRIPTION ENCOUNTER (OUTPATIENT)
Age: 50
End: 2020-06-01

## 2020-06-01 RX ORDER — PRUCALOPRIDE 2 MG/1
2 TABLET, FILM COATED ORAL
Qty: 30 | Refills: 2 | Status: ACTIVE | COMMUNITY
Start: 2020-04-21 | End: 1900-01-01

## 2020-06-02 ENCOUNTER — TRANSCRIPTION ENCOUNTER (OUTPATIENT)
Age: 50
End: 2020-06-02

## 2020-06-04 ENCOUNTER — APPOINTMENT (OUTPATIENT)
Dept: NEUROLOGY | Facility: CLINIC | Age: 50
End: 2020-06-04
Payer: MEDICARE

## 2020-06-04 VITALS
DIASTOLIC BLOOD PRESSURE: 89 MMHG | WEIGHT: 170 LBS | SYSTOLIC BLOOD PRESSURE: 135 MMHG | OXYGEN SATURATION: 96 % | TEMPERATURE: 98.5 F | BODY MASS INDEX: 21.14 KG/M2 | HEIGHT: 75 IN | HEART RATE: 92 BPM

## 2020-06-04 DIAGNOSIS — G44.001 CLUSTER HEADACHE SYNDROME, UNSPECIFIED, INTRACTABLE: ICD-10-CM

## 2020-06-04 DIAGNOSIS — G43.709 CHRONIC MIGRAINE W/OUT AURA, NOT INTRACTABLE, W/OUT STATUS MIGRAINOSUS: ICD-10-CM

## 2020-06-04 PROCEDURE — 64615 CHEMODENERV MUSC MIGRAINE: CPT

## 2020-06-04 NOTE — PROCEDURE
[FreeTextEntry2] : chronic daily headache [FreeTextEntry4] : ethyl chloride [FreeTextEntry1] : botox for headache [FreeTextEntry3] : Patient was consented with explanation of risks and benefits including black box warnings and explanation of alternative treatments. \par having daily headaches - migraine and cluster. Excellent headache control until month 3. \par Patient was injected in the sitting position with ethyl chloride spray used before each injection. \par Muscles injected:\par Procerus - 5 units\par  - 0 units in each side = 0 units\par Frontalis - 5 units in 2 injections on each side = 20 units\par Temporalis - 5 units in 4 injection in each side = 40 units\par Occipitalis - 5 units in 3 injections on each side = 30 units\par Splenius capitis - 5 units in 2 injections on each side = 20\par Trapezius - 5 units in 3 injections on each side = 30\par \par Total used 145 units Onabotulinum toxin\par Total wasted = 55\par Total billable = 200 units\par \par Patient tolerated procedure well with less pain. I have asked the patient to continue keeping track of headaches.\par

## 2020-06-17 ENCOUNTER — TRANSCRIPTION ENCOUNTER (OUTPATIENT)
Age: 50
End: 2020-06-17

## 2020-06-18 ENCOUNTER — TRANSCRIPTION ENCOUNTER (OUTPATIENT)
Age: 50
End: 2020-06-18

## 2020-07-22 ENCOUNTER — TRANSCRIPTION ENCOUNTER (OUTPATIENT)
Age: 50
End: 2020-07-22

## 2020-07-27 ENCOUNTER — TRANSCRIPTION ENCOUNTER (OUTPATIENT)
Age: 50
End: 2020-07-27

## 2020-08-06 ENCOUNTER — TRANSCRIPTION ENCOUNTER (OUTPATIENT)
Age: 50
End: 2020-08-06

## 2020-08-14 ENCOUNTER — APPOINTMENT (OUTPATIENT)
Dept: ULTRASOUND IMAGING | Facility: HOSPITAL | Age: 50
End: 2020-08-14

## 2020-08-31 ENCOUNTER — TRANSCRIPTION ENCOUNTER (OUTPATIENT)
Age: 50
End: 2020-08-31

## 2020-08-31 RX ORDER — PREDNISONE 5 MG/1
5 TABLET ORAL
Qty: 150 | Refills: 0 | Status: ACTIVE | COMMUNITY
Start: 2019-10-14 | End: 1900-01-01

## 2020-09-17 ENCOUNTER — APPOINTMENT (OUTPATIENT)
Dept: NEUROLOGY | Facility: CLINIC | Age: 50
End: 2020-09-17
Payer: MEDICARE

## 2020-09-17 VITALS
WEIGHT: 179 LBS | TEMPERATURE: 98.5 F | BODY MASS INDEX: 22.26 KG/M2 | OXYGEN SATURATION: 95 % | SYSTOLIC BLOOD PRESSURE: 148 MMHG | DIASTOLIC BLOOD PRESSURE: 95 MMHG | HEART RATE: 71 BPM | HEIGHT: 75 IN

## 2020-09-17 PROCEDURE — 64615 CHEMODENERV MUSC MIGRAINE: CPT

## 2020-09-17 NOTE — PROCEDURE
[FreeTextEntry1] : Botox [FreeTextEntry2] : Chronic migraines/headaches  [FreeTextEntry4] : Ethyl Chloride [FreeTextEntry3] : Patient was consented with explanation of risks and benefits including black box warnings and explanation of alternative treatments. Continues to see some improvement with headaches- mostly dull. \par \par  injections not done due to risk of ptosis. \par \par Patient was injected in the sitting position with ethyl chloride spray used before each injection. \par Muscles injected:\par Procerus - 5 units\par  - 0 units in each side = 0 units\par Frontalis - 5 units in 2 injections on each side = 20 units\par Temporalis - 5 units in 4 injection in each side = 40 units\par Occipitalis - 5 units in 3 injections on each side = 30 units\par Splenius capitis - 5 units in 2 injections on each side = 20\par Trapezius - 5 units in 3 injections on each side = 30\par \par Total used 145 units Onabotulinum toxin\par Total wasted = 55\par Total billable = 200 units\par \par Patient tolerated procedure well with less pain. I have asked the patient to continue keeping track of headaches.

## 2020-10-05 ENCOUNTER — APPOINTMENT (OUTPATIENT)
Dept: INTERNAL MEDICINE | Facility: CLINIC | Age: 50
End: 2020-10-05

## 2020-10-07 ENCOUNTER — TRANSCRIPTION ENCOUNTER (OUTPATIENT)
Age: 50
End: 2020-10-07

## 2020-10-14 ENCOUNTER — TRANSCRIPTION ENCOUNTER (OUTPATIENT)
Age: 50
End: 2020-10-14

## 2020-10-15 ENCOUNTER — TRANSCRIPTION ENCOUNTER (OUTPATIENT)
Age: 50
End: 2020-10-15

## 2020-10-16 ENCOUNTER — TRANSCRIPTION ENCOUNTER (OUTPATIENT)
Age: 50
End: 2020-10-16

## 2020-10-19 ENCOUNTER — TRANSCRIPTION ENCOUNTER (OUTPATIENT)
Age: 50
End: 2020-10-19

## 2020-11-20 ENCOUNTER — TRANSCRIPTION ENCOUNTER (OUTPATIENT)
Age: 50
End: 2020-11-20

## 2020-11-20 RX ORDER — GALCANEZUMAB 120 MG/ML
120 INJECTION, SOLUTION SUBCUTANEOUS
Qty: 1 | Refills: 3 | Status: ACTIVE | COMMUNITY
Start: 2019-10-02 | End: 1900-01-01

## 2020-11-23 ENCOUNTER — TRANSCRIPTION ENCOUNTER (OUTPATIENT)
Age: 50
End: 2020-11-23

## 2020-11-23 RX ORDER — BACLOFEN 10 MG/1
10 TABLET ORAL 3 TIMES DAILY
Qty: 90 | Refills: 2 | Status: ACTIVE | COMMUNITY
Start: 2019-11-01 | End: 1900-01-01

## 2020-11-24 ENCOUNTER — TRANSCRIPTION ENCOUNTER (OUTPATIENT)
Age: 50
End: 2020-11-24

## 2020-11-25 ENCOUNTER — TRANSCRIPTION ENCOUNTER (OUTPATIENT)
Age: 50
End: 2020-11-25

## 2020-12-14 ENCOUNTER — TRANSCRIPTION ENCOUNTER (OUTPATIENT)
Age: 50
End: 2020-12-14

## 2020-12-18 ENCOUNTER — APPOINTMENT (OUTPATIENT)
Dept: NEUROLOGY | Facility: CLINIC | Age: 50
End: 2020-12-18
Payer: MEDICARE

## 2020-12-18 VITALS
HEIGHT: 75 IN | HEART RATE: 87 BPM | SYSTOLIC BLOOD PRESSURE: 163 MMHG | DIASTOLIC BLOOD PRESSURE: 98 MMHG | OXYGEN SATURATION: 96 %

## 2020-12-18 PROCEDURE — 64615 CHEMODENERV MUSC MIGRAINE: CPT

## 2020-12-18 PROCEDURE — 99072 ADDL SUPL MATRL&STAF TM PHE: CPT

## 2020-12-18 NOTE — PROCEDURE
[FreeTextEntry1] : Botox [FreeTextEntry2] : Chronic migraines/headaches [FreeTextEntry4] : Ethyl Chloride  [FreeTextEntry3] : Patient was consented with explanation of risks and benefits including black box warnings and explanation of alternative treatments. Patient states he went to the hospital recently a month ago for the headaches- was given steroids which he insists helps. He says he will be transferring his neurological/migraine care in [place]. \par \par  injections done- mentioned risk of ptosis. Pt consented. \par \par Patient was injected in the sitting position with ethyl chloride spray used before each injection. \par Muscles injected:\par Procerus - 5 units\par  - 5 units in each side = 10 units\par Frontalis - 5 units in 2 injections on each side = 20 units\par Temporalis - 5 units in 4 injection in each side = 40 units\par Occipitalis - 5 units in 3 injections on each side = 30 units\par Splenius capitis - 5 units in 2 injections on each side = 20\par Trapezius - 5 units in 3 injections on each side = 30\par \par Total used 145 units Onabotulinum toxin\par Total wasted = 55\par Total billable = 200 units\par \par Patient tolerated procedure well with less pain. I have asked the patient to continue keeping track of headaches. \par

## 2021-01-28 ENCOUNTER — APPOINTMENT (OUTPATIENT)
Dept: GASTROENTEROLOGY | Facility: CLINIC | Age: 51
End: 2021-01-28

## 2021-08-26 NOTE — ED PROVIDER NOTE - PROVIDER TOKENS
PROVIDER:[TOKEN:[[id number]]],PROVIDER:[TOKEN:[[id number]]] Ndc (150mg Syringe): [address]-41 Ndc (150mg Syringe): [address]-60

## 2022-04-26 NOTE — ED ADULT NURSE NOTE - NS ED NOTE ABUSE RESPONSE YN
Procedure:  left ulna open reduction flexible nail, possible wrist pinning (Left Wrist)    Relevant Problems   No relevant active problems        Physical Exam    Airway    Mallampati score: I  TM Distance: >3 FB  Neck ROM: full     Dental   No notable dental hx     Cardiovascular  Cardiovascular exam normal    Pulmonary  Pulmonary exam normal     Other Findings        Anesthesia Plan  ASA Score- 1     Anesthesia Type- general with ASA Monitors  Additional Monitors:   Airway Plan: LMA  Plan Factors-    Chart reviewed  Patient summary reviewed  Induction- inhalational     Postoperative Plan- Plan for postoperative opioid use  Planned trial extubation    Informed Consent- Anesthetic plan and risks discussed with patient, mother and father  I personally reviewed this patient with the CRNA  Discussed and agreed on the Anesthesia Plan with the CRNA  Emerald Cobb Yes

## 2022-05-18 NOTE — ED PROVIDER NOTE - CONDUCTED A DETAILED DISCUSSION WITH PATIENT AND/OR GUARDIAN REGARDING, MDM
Current diet order meets estimated nutrient requirements
lab results/return to ED if symptoms worsen, persist or questions arise/radiology results/need for outpatient follow-up

## 2022-10-18 NOTE — ED ADULT NURSE NOTE - ED CARDIAC RATE
Group Therapy Note    Date: 10/18/2022    Group Start Time: 1330  Group End Time: 5451  Group Topic: Psychoeducation    SAY [facility] D    BRIANNA Benjamin      Psych-Ed/Relapse Prevention Group Note        Date: October 18, 2022 Start Time: 1:30pm  End Time: 2:10pm      Number of Participants in Group & Unit Census:  9/17    Topic: Coping skills, interpersonal skills & concentration. Goal of Group: To improve coping skill and concentration by collaborating with peers. Comments:     Patient did not participate in Psych-Ed/Relapse Prevention group, despite staff encouragement and explanation of benefits. Patient remain seclusive to self. Q15 minute safety checks maintained for patient safety and will continue to encourage patient to attend unit programming.         Signature:  BRIANNA Benjamin normal

## 2024-10-02 NOTE — ASSESSMENT
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 and depending on results may schedule repeat procedures\par -Pt will see Neuro 5/1 and have reached out to see if can help to get pt off excedrin and start other meds for cluster headaches. if possible will then assess response. if cannot, will plan for EGD\par \par Chronic constipation well controlled on linzess \par -continue Linzess to 145mcg daily \par -Recommended and strongly encouraged to start biofeedback therapy \par \par SMV on Xarelto\par F/U Dr. Guerrero regarding anticoagulation therapy and if any epigastric symptoms due to clot extension or if stable\par \par F/U visit in 2 month meghana

## 2025-05-07 NOTE — ED ADULT NURSE NOTE - NSSISCREENINGQ1_ED_A_ED
[General Appearance - Alert] : alert [General Appearance - In No Acute Distress] : in no acute distress [General Appearance - Well Nourished] : well nourished [General Appearance - Well Developed] : well developed No [General Appearance - Well-Appearing] : well appearing [Appearance Of Head] : the head was normocephalic [Facies] : there were no dysmorphic facial features [Sclera] : the conjunctiva were normal [Outer Ear] : the ears and nose were normal in appearance [Examination Of The Oral Cavity] : mucous membranes were moist and pink [Auscultation Breath Sounds / Voice Sounds] : breath sounds clear to auscultation bilaterally [Normal Chest Appearance] : the chest was normal in appearance [Apical Impulse] : quiet precordium with normal apical impulse [Heart Rate And Rhythm] : normal heart rate and rhythm [Heart Sounds] : normal S1 and S2 [No Murmur] : no murmurs  [Heart Sounds Gallop] : no gallops [Heart Sounds Pericardial Friction Rub] : no pericardial rub [Heart Sounds Click] : no clicks [Arterial Pulses] : normal upper and lower extremity pulses with no pulse delay [Edema] : no edema [Capillary Refill Test] : normal capillary refill [Bowel Sounds] : normal bowel sounds [Abdomen Soft] : soft [Nondistended] : nondistended [Abdomen Tenderness] : non-tender [Nail Clubbing] : no clubbing  or cyanosis of the fingers [Skin Lesions] : no lesions [Skin Turgor] : normal turgor [Demonstrated Behavior - Infant Nonreactive To Parents] : interactive [Mood] : mood and affect were appropriate for age [Demonstrated Behavior] : normal behavior [FreeTextEntry1] : tenderness to palpation at bilateral 2nd costochondral junctions  [Systolic] : systolic [II] : a grade 2/6 [LMSB] : LMSB  [Low] : low pitched [Vibratory] : vibratory [Mid] : mid [] : increases when lying on left side